# Patient Record
Sex: MALE | Race: WHITE | NOT HISPANIC OR LATINO | Employment: FULL TIME | URBAN - NONMETROPOLITAN AREA
[De-identification: names, ages, dates, MRNs, and addresses within clinical notes are randomized per-mention and may not be internally consistent; named-entity substitution may affect disease eponyms.]

---

## 2018-09-10 ENCOUNTER — OFFICE VISIT (OUTPATIENT)
Dept: UROLOGY | Facility: CLINIC | Age: 63
End: 2018-09-10

## 2018-09-10 VITALS
WEIGHT: 208 LBS | OXYGEN SATURATION: 95 % | TEMPERATURE: 97.4 F | BODY MASS INDEX: 33.43 KG/M2 | HEART RATE: 81 BPM | DIASTOLIC BLOOD PRESSURE: 70 MMHG | HEIGHT: 66 IN | SYSTOLIC BLOOD PRESSURE: 120 MMHG

## 2018-09-10 DIAGNOSIS — N20.0 NEPHROLITHIASIS: ICD-10-CM

## 2018-09-10 DIAGNOSIS — R79.89 LOW TESTOSTERONE: Primary | ICD-10-CM

## 2018-09-10 PROCEDURE — 99244 OFF/OP CNSLTJ NEW/EST MOD 40: CPT | Performed by: UROLOGY

## 2018-09-10 RX ORDER — LOSARTAN POTASSIUM 50 MG/1
TABLET ORAL
COMMUNITY
Start: 2018-08-27

## 2018-09-10 RX ORDER — TESTOSTERONE 16.2 MG/G
GEL TRANSDERMAL
COMMUNITY
Start: 2018-07-25 | End: 2018-10-05 | Stop reason: ALTCHOICE

## 2018-09-10 NOTE — PROGRESS NOTES
Chief Complaint  Low testosterone    HPI  Mr. Hollis is a 62 y.o. male  with history of TIA (2007) and stones who presents with low testosterone.  The serum test was collected due to the following complaints: low libido.    Low libido   yes  Low energy   no exercises QD w/ elliptical  Poor sleep   no, wears CPAP  Mental clarity issues  no    History of depression? no  Erectile dysfunction?  no    Any potential interest in conception?  no     No residual deficits since TIA in past.  No Hx of blood clots    He has been on Androgel and Axiron in the past, he has taken it sporadically and has had trouble with getting dose right, getting too high then too low. When levels taken in January, he was not using any gels at that time or recently.     Past Medical History  Past Medical History:   Diagnosis Date   • Arthritis    • Colon polyps    • Hypertension    • Kidney stone    • Kidney stones     Lithotripsy in past   • Stroke (cerebrum) (CMS/HCC)    • Stroke (CMS/HCC)        Past Surgical History  Past Surgical History:   Procedure Laterality Date   • APPENDECTOMY     • EXTRACORPOREAL SHOCK WAVE LITHOTRIPSY (ESWL)         Medications    Current Outpatient Prescriptions:   •  ANDROGEL PUMP 20.25 MG/ACT (1.62%) gel, , Disp: , Rfl:   •  aspirin 81 MG chewable tablet, Chew 81 mg daily., Disp: , Rfl:   •  doxazosin (CARDURA) 4 MG tablet, , Disp: , Rfl:   •  felodipine (PLENDIL) 10 MG 24 hr tablet, , Disp: , Rfl:   •  hydrochlorothiazide (MICROZIDE) 12.5 MG capsule, Take 12.5 mg by mouth daily., Disp: , Rfl:   •  losartan (COZAAR) 50 MG tablet, , Disp: , Rfl:     Allergies  No Known Allergies    Social History  Social History     Social History   • Marital status:      Spouse name: N/A   • Number of children: N/A   • Years of education: N/A     Occupational History   • Not on file.     Social History Main Topics   • Smoking status: Never Smoker   • Smokeless tobacco: Never Used   • Alcohol use 3.0  "oz/week     5 Cans of beer per week   • Drug use: No   • Sexual activity: Not on file     Other Topics Concern   • Not on file     Social History Narrative   • No narrative on file       Family History  He has no family history of prostate cancer    Review of Systems  Constitutional: No fevers or chills  Skin: Negative for rash  Endocrine: No heat/cold intolerance   Cardiovascular: Negative for chest pain or dyspnea on exertion  Respiratory: Negative for shortness of breath or wheezing  Gastrointestinal: No constipation, nausea or vomiting  Genitourinary: Negative for new lower urinary tract symptoms, current gross hematuria or dysuria.  Musculoskeletal: No flank pain  Neurological:  Negative for frequent headaches or dizziness  Lymph/Heme: Negative for leg swelling or calf pain.    Physical Exam  Visit Vitals  /70   Pulse 81   Temp 97.4 °F (36.3 °C)   Ht 167.6 cm (65.98\")   Wt 94.3 kg (208 lb)   SpO2 95%   BMI 33.59 kg/m²     Constitutional: NAD, WDWN.   HEENT: NCAT. Conjunctivae normal.  MMM.    Cardiovascular: Regular rate.  Pulmonary/Chest: Respirations are even and non-labored bilaterally.  Abdominal: Soft. No distension, tenderness, masses or guarding. No CVA tenderness.  Neurological: A + O x 3.  Cranial Nerves II-XII grossly intact. Normal gait.  Extremities: RADHA x 4, Warm. No clubbing.  No cyanosis.    Skin: Pink, warm and dry.  No rashes noted.  Psychiatric:  Normal mood and affect    Genitourinary  Penis: circumcised penis, glans normal, no penile discharge.  No rashes/lesions.    Testes: descended bilaterally, no masses, nontender to palpation. Remainder of scrotal contents normal  Perineum:  No perineal pain with palpation  Rectal:  Normal tone, no masses  Prostate:  35 grams, symmetric, non-tender, anodular and no induration    Labs  Labs from Dr. Mcdermott's office 1/19/18  Total T - 145  Free T - 3.5    No results found for: TESTOSTERONE    No results found for: PSA    No results found for: WBC, " RBC, HGB, HCT, MCV, MCH, MCHC, RDW, RDWSD, MPV, PLT, NEUTRORELPCT, LYMPHORELPCT, MONORELPCT, EOSRELPCT, BASORELPCT, AUTOIGPER, NEUTROABS, LYMPHSABS, MONOSABS, EOSABS, BASOSABS, AUTOIGNUM, NRBC      Assessment  Mr. Hollis is a 62 y.o. male with low testosterone.  Today we discussed the role testosterone plays for a male patient. I have indicated that low testosterone can be associated with metabolic syndrome, erectile dysfunction, coronary artery disease, diabetes, depression, osteoporosis, fatigue, low sex drive, poor sleep, high cholesterol, increased abdominal fat, muscle loss, irritability, hot flashes, and inability to concentrate.  I also warned him of the studies that support and refute the evidence of cardiovascular disease associated with testosterone replacement therapy.  We did also discuss the risk of blood clots associated with testosterone replacement therapy.  He wishes to proceed.     Treatment options were discussed including topical gel or patch, testosterone injections every 2-3 weeks and testosterone pellets placed in clinic every 4-6 months.    Plan  1.  He wishes to proceed with a trial of testopel - we will start with 10 pellets  2.  T labs today for baseline - tT,fT, SHBG, etsradiol, PSA, HCT, Prolactin  3.  KUB and ultrasound prior to next visit, given his history of stone disease.      Ken Be MD

## 2018-09-12 LAB
ESTRADIOL SERPL-MCNC: 31.2 PG/ML (ref 7.6–42.6)
HCT VFR BLD AUTO: 46.1 % (ref 42–52)
HGB BLD-MCNC: 15.5 G/DL (ref 14–18)
PROLACTIN SERPL-MCNC: 8.4 NG/ML (ref 4–15.2)
PSA SERPL-MCNC: 0.84 NG/ML (ref 0.06–4)
SHBG SERPL-SCNC: 32.8 NMOL/L (ref 19.3–76.4)
TESTOST FREE SERPL-MCNC: 4.7 PG/ML (ref 6.6–18.1)
TESTOST SERPL-MCNC: 241 NG/DL (ref 264–916)

## 2018-09-28 ENCOUNTER — HOSPITAL ENCOUNTER (OUTPATIENT)
Dept: ULTRASOUND IMAGING | Facility: HOSPITAL | Age: 63
Discharge: HOME | End: 2018-09-28
Attending: UROLOGY | Admitting: UROLOGY

## 2018-09-28 DIAGNOSIS — N20.0 NEPHROLITHIASIS: ICD-10-CM

## 2018-09-28 PROCEDURE — 76775 US EXAM ABDO BACK WALL LIM: CPT

## 2018-10-05 ENCOUNTER — PROCEDURE VISIT (OUTPATIENT)
Dept: UROLOGY | Facility: CLINIC | Age: 63
End: 2018-10-05

## 2018-10-05 VITALS — HEIGHT: 66 IN | BODY MASS INDEX: 33.43 KG/M2 | WEIGHT: 208 LBS

## 2018-10-05 DIAGNOSIS — R79.89 LOW TESTOSTERONE: Primary | ICD-10-CM

## 2018-10-05 PROCEDURE — 11980 IMPLANT HORMONE PELLET(S): CPT | Performed by: UROLOGY

## 2018-10-05 PROCEDURE — S0189 TESTOSTERONE PELLET 75 MG: HCPCS | Performed by: UROLOGY

## 2018-10-05 NOTE — PROGRESS NOTES
OFFICE PROCEDURE NOTE      PREPROCEDURE DIAGNOSIS:  Hypogonadism.      POSTPROCEDURE DIAGNOSIS:  Hypogonadism.      PROCEDURE:  Testopel insertion/implant in Right flank, 11 pellets  implanted.   NDC# 45564- 04-20     SURGEON:    Ken Be MD    ANESTHESIA:  Local.      DRAINS:  None.     Acne                                                    No  Breast swelling/tenderness                 No                      Leg swelling                                        No  Energy                                               so so (this is his first insertion)   Sexual Desire                                    low  Erections                                            ok     Labs reviewed,   Results for SHOAIB REAL (MRN [id number]) as of 10/5/2018 15:17   Ref. Range 9/10/2018 11:55   Prolactin Latest Ref Range: 4.0 - 15.2 ng/mL 8.4   Estradiol Latest Ref Range: 7.6 - 42.6 pg/mL 31.2   Sex Hormone Binding Globulin Latest Ref Range: 19.3 - 76.4 nmol/L 32.8   Testosterone, Total Latest Ref Range: 264 - 916 ng/dL 241 (L)   Testosterone, Free Latest Ref Range: 6.6 - 18.1 pg/mL 4.7 (L)   Hemoglobin Latest Ref Range: 14.0 - 18.0 g/dL 15.5   Hematocrit Latest Ref Range: 42.0 - 52.0 % 46.1   PSA Latest Ref Range: 0.060 - 4.000 ng/mL 0.837        The risks/benefits of the procedure were discussed with the patient.     DESCRIPTION OF PROCEDURE: The patient was placed in the lateral decubitus position and his skin was prepped with chlorhexadine solution. 10 ml of 1% lidocaine w/epinephrine were instilled subcutaneously in a straight fashion. A 3mm skin puncture was made with an 11 blade. The trochar was placed subcutaneously along the line with ease and without patient discomfort. The sharp stylet was removed and the pellets were placed along the track and positioned using the blunt stylet. Following this,  a steri strip was used to close the puncture wound. A 4/4 gauze pad was placed over the wound and a Tegaderm  bandage was applied and the patient was repositioned on his opposite side for compression purposes with a roll.      PLAN:  The patient was discharged in stable condition to be followed up with serial serum testosterone levels. Follow up for next visit in 4 months.  We will repeat labs in 2 months

## 2018-12-05 ENCOUNTER — RESULTS ENCOUNTER (OUTPATIENT)
Dept: UROLOGY | Facility: CLINIC | Age: 63
End: 2018-12-05

## 2018-12-05 DIAGNOSIS — R79.89 LOW TESTOSTERONE: ICD-10-CM

## 2019-02-01 LAB
ESTRADIOL SERPL-MCNC: 20.9 PG/ML (ref 7.6–42.6)
HCT VFR BLD AUTO: 46.7 % (ref 42–52)
HGB BLD-MCNC: 15.8 G/DL (ref 14–18)
SHBG SERPL-SCNC: 32.9 NMOL/L (ref 19.3–76.4)
TESTOST FREE SERPL-MCNC: 4.9 PG/ML (ref 6.6–18.1)
TESTOST SERPL-MCNC: 322 NG/DL (ref 264–916)

## 2019-02-05 ENCOUNTER — OFFICE VISIT (OUTPATIENT)
Dept: UROLOGY | Facility: CLINIC | Age: 64
End: 2019-02-05

## 2019-02-05 VITALS — TEMPERATURE: 97.9 F | BODY MASS INDEX: 33.43 KG/M2 | HEIGHT: 66 IN | WEIGHT: 208 LBS

## 2019-02-05 DIAGNOSIS — R79.89 LOW TESTOSTERONE: Primary | ICD-10-CM

## 2019-02-05 PROCEDURE — 99213 OFFICE O/P EST LOW 20 MIN: CPT | Performed by: UROLOGY

## 2019-02-05 NOTE — PROGRESS NOTES
Chief Complaint  Low testosterone    HPI  Mr. Hollis is a 63 y.o. male  with history of TIA (2007) and stones who presents for follow-up of low testosterone.    He says that since having the pellets placed in October, his energy level has been good, his libido has been great; he and his wife have intercourse approximately 2-3 times per week.  He is overall happy and feels like his energy and sex drive levels have been more even and constant.    At initial visit,  Low libido   yes  Low energy   no exercises QD w/ elliptical  Poor sleep   no, wears CPAP  Mental clarity issues  no  History of depression? no  Erectile dysfunction?  no  Any potential interest in conception?  no   No residual deficits since TIA in past.  No Hx of blood clots  He has been on Androgel and Axiron in the past, he has taken it sporadically and has had trouble with getting dose right, getting too high then too low. When levels taken in January, he was not using any gels at that time or recently.     Past Medical History  Past Medical History:   Diagnosis Date   • Arthritis    • Colon polyps    • Hypertension    • Kidney stone    • Kidney stones     Lithotripsy in past   • Stroke (cerebrum) (CMS/HCC)    • Stroke (CMS/HCC)        Past Surgical History  Past Surgical History:   Procedure Laterality Date   • APPENDECTOMY     • EXTRACORPOREAL SHOCK WAVE LITHOTRIPSY (ESWL)         Medications    Current Outpatient Medications:   •  aspirin 81 MG chewable tablet, Chew 81 mg daily., Disp: , Rfl:   •  doxazosin (CARDURA) 4 MG tablet, , Disp: , Rfl:   •  felodipine (PLENDIL) 10 MG 24 hr tablet, , Disp: , Rfl:   •  hydrochlorothiazide (MICROZIDE) 12.5 MG capsule, Take 12.5 mg by mouth daily., Disp: , Rfl:   •  losartan (COZAAR) 50 MG tablet, , Disp: , Rfl:     Allergies  No Known Allergies    Social History  Social History     Socioeconomic History   • Marital status:      Spouse name: Not on file   • Number of children: Not  "on file   • Years of education: Not on file   • Highest education level: Not on file   Social Needs   • Financial resource strain: Not on file   • Food insecurity - worry: Not on file   • Food insecurity - inability: Not on file   • Transportation needs - medical: Not on file   • Transportation needs - non-medical: Not on file   Occupational History   • Not on file   Tobacco Use   • Smoking status: Never Smoker   • Smokeless tobacco: Never Used   Substance and Sexual Activity   • Alcohol use: Yes     Alcohol/week: 3.0 oz     Types: 5 Cans of beer per week   • Drug use: No   • Sexual activity: Not on file   Other Topics Concern   • Not on file   Social History Narrative   • Not on file       Family History  He has no family history of prostate cancer    Review of Systems  Constitutional: No fevers or chills  Skin: Negative for rash  Endocrine: No heat/cold intolerance   Cardiovascular: Negative for chest pain or dyspnea on exertion  Respiratory: Negative for shortness of breath or wheezing  Gastrointestinal: No constipation, nausea or vomiting  Genitourinary: Negative for new lower urinary tract symptoms, current gross hematuria or dysuria.  Musculoskeletal: No flank pain  Neurological:  Negative for frequent headaches or dizziness  Lymph/Heme: Negative for leg swelling or calf pain.    Physical Exam  Visit Vitals  Temp 97.9 °F (36.6 °C)   Ht 167.6 cm (65.98\")   Wt 94.3 kg (208 lb)   BMI 33.59 kg/m²     Constitutional: NAD, WDWN.   HEENT: NCAT. Conjunctivae normal.  MMM.    Cardiovascular: Regular rate.  Pulmonary/Chest: Respirations are even and non-labored bilaterally.  Abdominal: Soft. No distension, tenderness, masses or guarding. No CVA tenderness.  Neurological: A + O x 3.  Cranial Nerves II-XII grossly intact. Normal gait.  Extremities: RADHA x 4, Warm. No clubbing.  No cyanosis.    Skin: Pink, warm and dry.  No rashes noted.  Psychiatric:  Normal mood and affect    Labs  Labs from Dr. Mcdermott's office " 1/19/18  Total T - 145  Free T - 3.5    No results found for: TESTOSTERONE    Lab Results   Component Value Date    PSA 0.837 09/10/2018       Hemoglobin   Date Value Ref Range Status   01/31/2019 15.8 14.0 - 18.0 g/dL Final     Hematocrit   Date Value Ref Range Status   01/31/2019 46.7 42.0 - 52.0 % Final         Assessment  Mr. Hollis is a 63 y.o. male with low testosterone.  He is currently happy on Testopel.  His total testosterone is within normal limits, and has drifted down a little bit I suspect over the past 4 months.  Usually we check this 2 months after pellet implantation.  Regardless, his energy and libido have improved.     Plan  1.  FU for placement of 11 pellets next week  2.  reepat labs 2 mo after pellets      Ken Be MD

## 2019-02-19 ENCOUNTER — OFFICE VISIT (OUTPATIENT)
Dept: UROLOGY | Facility: CLINIC | Age: 64
End: 2019-02-19

## 2019-02-19 VITALS — BODY MASS INDEX: 33.43 KG/M2 | RESPIRATION RATE: 16 BRPM | HEIGHT: 66 IN | WEIGHT: 208 LBS

## 2019-02-19 DIAGNOSIS — R79.89 LOW TESTOSTERONE: Primary | ICD-10-CM

## 2019-02-19 PROCEDURE — S0189 TESTOSTERONE PELLET 75 MG: HCPCS | Performed by: UROLOGY

## 2019-02-19 PROCEDURE — 11980 IMPLANT HORMONE PELLET(S): CPT | Performed by: UROLOGY

## 2019-02-19 NOTE — PROGRESS NOTES
OFFICE PROCEDURE NOTE      PREPROCEDURE DIAGNOSIS:  Hypogonadism.      POSTPROCEDURE DIAGNOSIS:  Hypogonadism.      PROCEDURE:  Testopel insertion/implant in Left flank, 11 pellets  implanted.   NDC# 09584- 04-20     SURGEON:    Ken Be MD    ANESTHESIA:  Local.      Acne                                                    No  Breast swelling/tenderness                 No                      Leg swelling                                        No  Energy                                               good  Sexual Desire                                    good  Erections                                            ok     Labs reviewed,   Results for SHOAIB REAL (MRN [id number]) as of 2/19/2019 15:50   Ref. Range 1/31/2019 09:30   Estradiol Latest Ref Range: 7.6 - 42.6 pg/mL 20.9   Sex Hormone Binding Globulin Latest Ref Range: 19.3 - 76.4 nmol/L 32.9   Testosterone, Total Latest Ref Range: 264 - 916 ng/dL 322   Testosterone, Free Latest Ref Range: 6.6 - 18.1 pg/mL 4.9 (L)   Hemoglobin Latest Ref Range: 14.0 - 18.0 g/dL 15.8   Hematocrit Latest Ref Range: 42.0 - 52.0 % 46.7        The risks/benefits of the procedure were discussed with the patient.     DESCRIPTION OF PROCEDURE: The patient was placed in the lateral decubitus position and his skin was prepped with chlorhexadine solution. 10 ml of 1% lidocaine w/epinephrine were instilled subcutaneously in a straight fashion. A 3mm skin puncture was made with an 11 blade. The trochar was placed subcutaneously along the line with ease and without patient discomfort. The sharp stylet was removed and the pellets were placed along the track and positioned using the blunt stylet. Following this,  a steri strip was used to close the puncture wound. A 4/4 gauze pad was placed over the wound and a Tegaderm bandage was applied and the patient was repositioned on his opposite side for compression purposes with a roll.      PLAN:  The patient was discharged in stable  condition to be followed up with serial serum testosterone levels. Follow up for next visit in 4 months.  We will repeat labs in 2 months

## 2019-04-06 ENCOUNTER — LAB REQUISITION (OUTPATIENT)
Dept: LAB | Facility: HOSPITAL | Age: 64
End: 2019-04-06

## 2019-04-06 DIAGNOSIS — Z79.01 LONG TERM CURRENT USE OF ANTICOAGULANT: ICD-10-CM

## 2019-04-06 PROCEDURE — 85610 PROTHROMBIN TIME: CPT | Performed by: NURSE PRACTITIONER

## 2019-04-19 ENCOUNTER — RESULTS ENCOUNTER (OUTPATIENT)
Dept: UROLOGY | Facility: CLINIC | Age: 64
End: 2019-04-19

## 2019-04-19 DIAGNOSIS — R79.89 LOW TESTOSTERONE: ICD-10-CM

## 2019-04-25 LAB
INR PPP: NORMAL (ref 0.8–1.2)
PROTHROMBIN TIME: NORMAL SECONDS (ref 11.1–15.3)

## 2019-06-24 ENCOUNTER — TRANSCRIBE ORDERS (OUTPATIENT)
Dept: UROLOGY | Facility: CLINIC | Age: 64
End: 2019-06-24

## 2019-06-24 ENCOUNTER — LAB (OUTPATIENT)
Dept: LAB | Facility: HOSPITAL | Age: 64
End: 2019-06-24

## 2019-06-24 DIAGNOSIS — R79.89 HYPOURICEMIA: Primary | ICD-10-CM

## 2019-06-24 DIAGNOSIS — R79.89 HYPOURICEMIA: ICD-10-CM

## 2019-06-24 PROCEDURE — 82670 ASSAY OF TOTAL ESTRADIOL: CPT | Performed by: UROLOGY

## 2019-06-24 PROCEDURE — 85014 HEMATOCRIT: CPT | Performed by: UROLOGY

## 2019-06-24 PROCEDURE — 84270 ASSAY OF SEX HORMONE GLOBUL: CPT | Performed by: UROLOGY

## 2019-06-24 PROCEDURE — 84402 ASSAY OF FREE TESTOSTERONE: CPT | Performed by: UROLOGY

## 2019-06-24 PROCEDURE — 84403 ASSAY OF TOTAL TESTOSTERONE: CPT | Performed by: UROLOGY

## 2019-06-24 PROCEDURE — 85018 HEMOGLOBIN: CPT | Performed by: UROLOGY

## 2019-07-18 ENCOUNTER — OFFICE VISIT (OUTPATIENT)
Dept: UROLOGY | Facility: CLINIC | Age: 64
End: 2019-07-18

## 2019-07-18 VITALS — WEIGHT: 208 LBS | HEIGHT: 66 IN | BODY MASS INDEX: 33.43 KG/M2 | RESPIRATION RATE: 16 BRPM

## 2019-07-18 DIAGNOSIS — R79.89 LOW TESTOSTERONE: Primary | ICD-10-CM

## 2019-07-18 PROCEDURE — 11980 IMPLANT HORMONE PELLET(S): CPT | Performed by: UROLOGY

## 2019-07-18 PROCEDURE — S0189 TESTOSTERONE PELLET 75 MG: HCPCS | Performed by: UROLOGY

## 2019-07-18 NOTE — PROGRESS NOTES
OFFICE PROCEDURE NOTE      PREPROCEDURE DIAGNOSIS:  Hypogonadism.      POSTPROCEDURE DIAGNOSIS:  Hypogonadism.      PROCEDURE:  Testopel insertion/implant in Right flank, 11 pellets  implanted.   NDC# 10511- 04-20     SURGEON:    Ken Be MD    ANESTHESIA:  Local.      Acne                                                    No  Breast swelling/tenderness                 No                      Leg swelling                                        No  Energy                                               good  Sexual Desire                                    good  Erections                                            ok     Labs reviewed,   Results for SHOAIB REAL (MRN [id number]) as of 7/18/2019 14:39   Ref. Range 6/24/2019 16:30   Estradiol Latest Ref Range: 7.6 - 42.6 pg/mL 33.2   Sex Hormone Binding Globulin Latest Ref Range: 19.3 - 76.4 nmol/L 31.4   Testosterone, Total Latest Ref Range: 264 - 916 ng/dL 319   Testosterone, Free Latest Ref Range: 6.6 - 18.1 pg/mL 6.0 (L)   Hemoglobin Latest Ref Range: 13.0 - 17.7 g/dL 15.8   Hematocrit Latest Ref Range: 37.5 - 51.0 % 46.8          The risks/benefits of the procedure were discussed with the patient.     DESCRIPTION OF PROCEDURE: The patient was placed in the lateral decubitus position and his skin was prepped with chlorhexadine solution. 10 ml of 1% lidocaine w/epinephrine were instilled subcutaneously in a straight fashion. A 3mm skin puncture was made with an 11 blade. The trochar was placed subcutaneously along the line with ease and without patient discomfort. The sharp stylet was removed and the pellets were placed along the track and positioned using the blunt stylet. Following this,  a steri strip was used to close the puncture wound. A 4/4 gauze pad was placed over the wound and a Tegaderm bandage was applied and the patient was repositioned on his opposite side for compression purposes with a roll.      PLAN:  The patient was discharged in  stable condition to be followed up with serial serum testosterone levels. Follow up for next visit in 4 months.  We will repeat labs in 2 months

## 2019-09-11 DIAGNOSIS — R79.89 LOW TESTOSTERONE: ICD-10-CM

## 2019-09-13 LAB
ESTRADIOL SERPL-MCNC: 41.2 PG/ML (ref 7.6–42.6)
HCT VFR BLD AUTO: 47 % (ref 37.5–51)
HGB BLD-MCNC: 15.2 G/DL (ref 13–17.7)
SHBG SERPL-SCNC: 29.9 NMOL/L (ref 19.3–76.4)
TESTOST FREE SERPL-MCNC: 9.1 PG/ML (ref 6.6–18.1)
TESTOST SERPL-MCNC: 523 NG/DL (ref 264–916)

## 2019-11-21 ENCOUNTER — OFFICE VISIT (OUTPATIENT)
Dept: UROLOGY | Facility: CLINIC | Age: 64
End: 2019-11-21

## 2019-11-21 VITALS — HEIGHT: 66 IN | WEIGHT: 208 LBS | RESPIRATION RATE: 19 BRPM | BODY MASS INDEX: 33.43 KG/M2

## 2019-11-21 DIAGNOSIS — R79.89 LOW TESTOSTERONE: Primary | ICD-10-CM

## 2019-11-21 PROCEDURE — 11980 IMPLANT HORMONE PELLET(S): CPT | Performed by: UROLOGY

## 2019-11-21 PROCEDURE — S0189 TESTOSTERONE PELLET 75 MG: HCPCS | Performed by: UROLOGY

## 2019-11-21 NOTE — PROGRESS NOTES
OFFICE PROCEDURE NOTE      PREPROCEDURE DIAGNOSIS:  Hypogonadism.      POSTPROCEDURE DIAGNOSIS:  Hypogonadism.      PROCEDURE:  Testopel insertion/implant in Right flank, 11 pellets  implanted.   NDC# 07836- 04-20     SURGEON:    Ken Be MD    ANESTHESIA:  Local.      Acne                                                    No  Breast swelling/tenderness                 No                      Leg swelling                                        No  Energy                                               good  Sexual Desire                                    good  Erections                                            ok     Labs reviewed,   Results for SHOAIB REAL (MRN [id number]) as of 11/21/2019 14:12   Ref. Range 9/11/2019 14:27   Estradiol Latest Ref Range: 7.6 - 42.6 pg/mL 41.2   Sex Hormone Binding Globulin Latest Ref Range: 19.3 - 76.4 nmol/L 29.9   Testosterone, Total Latest Ref Range: 264 - 916 ng/dL 523   Testosterone, Free Latest Ref Range: 6.6 - 18.1 pg/mL 9.1   Hemoglobin Latest Ref Range: 13.0 - 17.7 g/dL 15.2   Hematocrit Latest Ref Range: 37.5 - 51.0 % 47.0          The risks/benefits of the procedure were discussed with the patient.     DESCRIPTION OF PROCEDURE: The patient was placed in the lateral decubitus position and his skin was prepped with chlorhexadine solution. 10 ml of 1% lidocaine w/epinephrine were instilled subcutaneously in a straight fashion. A 3mm skin puncture was made with an 11 blade. The trochar was placed subcutaneously along the line with ease and without patient discomfort. The sharp stylet was removed and the pellets were placed along the track and positioned using the blunt stylet. Following this,  a steri strip was used to close the puncture wound. A 4/4 gauze pad was placed over the wound and a Tegaderm bandage was applied and the patient was repositioned on his opposite side for compression purposes with a roll.      PLAN:  The patient was discharged in stable  condition to be followed up with serial serum testosterone levels. Follow up for next visit in 4 months.  We will repeat labs in 2 months

## 2020-01-21 ENCOUNTER — RESULTS ENCOUNTER (OUTPATIENT)
Dept: UROLOGY | Facility: CLINIC | Age: 65
End: 2020-01-21

## 2020-01-21 DIAGNOSIS — R79.89 LOW TESTOSTERONE: ICD-10-CM

## 2020-01-29 ENCOUNTER — TRANSCRIBE ORDERS (OUTPATIENT)
Dept: ADMINISTRATIVE | Facility: HOSPITAL | Age: 65
End: 2020-01-29

## 2020-01-29 DIAGNOSIS — N28.9 RENAL INSUFFICIENCY: Primary | ICD-10-CM

## 2020-01-30 LAB
HCT VFR BLD AUTO: 46.1 % (ref 37.5–51)
HGB BLD-MCNC: 15.6 G/DL (ref 13–17.7)
SHBG SERPL-SCNC: 27.8 NMOL/L (ref 19.3–76.4)
TESTOST FREE SERPL-MCNC: 13.8 PG/ML (ref 6.6–18.1)
TESTOST SERPL-MCNC: 550 NG/DL (ref 264–916)

## 2020-02-06 ENCOUNTER — HOSPITAL ENCOUNTER (OUTPATIENT)
Dept: ULTRASOUND IMAGING | Facility: HOSPITAL | Age: 65
Discharge: HOME | End: 2020-02-06
Admitting: FAMILY MEDICINE

## 2020-02-06 DIAGNOSIS — N28.9 RENAL INSUFFICIENCY: ICD-10-CM

## 2020-02-06 PROCEDURE — 76775 US EXAM ABDO BACK WALL LIM: CPT

## 2020-06-23 ENCOUNTER — LAB (OUTPATIENT)
Dept: UROLOGY | Facility: CLINIC | Age: 65
End: 2020-06-23

## 2020-06-23 DIAGNOSIS — R79.89 LOW TESTOSTERONE: Primary | ICD-10-CM

## 2020-06-24 LAB
BASOPHILS # BLD AUTO: 0.04 10*3/MM3 (ref 0–0.2)
BASOPHILS NFR BLD AUTO: 0.6 % (ref 0–1.5)
EOSINOPHIL # BLD AUTO: 0.13 10*3/MM3 (ref 0–0.4)
EOSINOPHIL NFR BLD AUTO: 2 % (ref 0.3–6.2)
ERYTHROCYTE [DISTWIDTH] IN BLOOD BY AUTOMATED COUNT: 13.6 % (ref 12.3–15.4)
ESTRADIOL SERPL-MCNC: 20.9 PG/ML (ref 7.6–42.6)
HCT VFR BLD AUTO: 43.8 % (ref 37.5–51)
HGB BLD-MCNC: 15.1 G/DL (ref 13–17.7)
IMM GRANULOCYTES # BLD AUTO: 0.02 10*3/MM3 (ref 0–0.05)
IMM GRANULOCYTES NFR BLD AUTO: 0.3 % (ref 0–0.5)
LYMPHOCYTES # BLD AUTO: 1.36 10*3/MM3 (ref 0.7–3.1)
LYMPHOCYTES NFR BLD AUTO: 21.1 % (ref 19.6–45.3)
MCH RBC QN AUTO: 30.1 PG (ref 26.6–33)
MCHC RBC AUTO-ENTMCNC: 34.5 G/DL (ref 31.5–35.7)
MCV RBC AUTO: 87.4 FL (ref 79–97)
MONOCYTES # BLD AUTO: 0.57 10*3/MM3 (ref 0.1–0.9)
MONOCYTES NFR BLD AUTO: 8.8 % (ref 5–12)
NEUTROPHILS # BLD AUTO: 4.33 10*3/MM3 (ref 1.7–7)
NEUTROPHILS NFR BLD AUTO: 67.2 % (ref 42.7–76)
NRBC BLD AUTO-RTO: 0 /100 WBC (ref 0–0.2)
PLATELET # BLD AUTO: 250 10*3/MM3 (ref 140–450)
RBC # BLD AUTO: 5.01 10*6/MM3 (ref 4.14–5.8)
TESTOST SERPL-MCNC: 163 NG/DL (ref 264–916)
WBC # BLD AUTO: 6.45 10*3/MM3 (ref 3.4–10.8)

## 2020-12-28 ENCOUNTER — OFFICE VISIT (OUTPATIENT)
Dept: UROLOGY | Facility: CLINIC | Age: 65
End: 2020-12-28

## 2020-12-28 VITALS
HEIGHT: 66 IN | BODY MASS INDEX: 33.43 KG/M2 | TEMPERATURE: 98.3 F | OXYGEN SATURATION: 93 % | WEIGHT: 208 LBS | HEART RATE: 84 BPM

## 2020-12-28 DIAGNOSIS — R79.89 LOW TESTOSTERONE: Primary | ICD-10-CM

## 2020-12-28 PROCEDURE — 99214 OFFICE O/P EST MOD 30 MIN: CPT | Performed by: UROLOGY

## 2020-12-28 RX ORDER — SILDENAFIL 100 MG/1
TABLET, FILM COATED ORAL
COMMUNITY
Start: 2020-11-13

## 2020-12-28 RX ORDER — TESTOSTERONE 16.2 MG/G
GEL TRANSDERMAL
COMMUNITY
Start: 2020-12-03 | End: 2020-12-28

## 2020-12-28 NOTE — PROGRESS NOTES
Chief Complaint  Low testosterone    HPI  Mr. Hollis is a 65 y.o. male  with history of TIA (2007) and stones who presents for follow-up of low testosterone.    He says that he was very happy with pellets.  He was scared to follow-up during the pandemic and use some old testosterone gel that he had.  He wants to get back on the pellets.    At initial visit,  Low libido   yes  Low energy   no exercises QD w/ elliptical  Poor sleep   no, wears CPAP  Mental clarity issues  no  History of depression? no  Erectile dysfunction?  no  Any potential interest in conception?  no   No residual deficits since TIA in past.  No Hx of blood clots  He has been on Androgel and Axiron in the past, he has taken it sporadically and has had trouble with getting dose right, getting too high then too low. When levels taken in January, he was not using any gels at that time or recently.     Past Medical History  Past Medical History:   Diagnosis Date   • Arthritis    • Colon polyps    • Hypertension    • Kidney stone    • Kidney stones     Lithotripsy in past   • Stroke (cerebrum) (CMS/HCC)    • Stroke (CMS/HCC)        Past Surgical History  Past Surgical History:   Procedure Laterality Date   • APPENDECTOMY     • EXTRACORPOREAL SHOCK WAVE LITHOTRIPSY (ESWL)         Medications    Current Outpatient Medications:   •  aspirin 81 MG chewable tablet, Chew 81 mg daily., Disp: , Rfl:   •  doxazosin (CARDURA) 4 MG tablet, , Disp: , Rfl:   •  felodipine (PLENDIL) 10 MG 24 hr tablet, , Disp: , Rfl:   •  hydrochlorothiazide (MICROZIDE) 12.5 MG capsule, Take 12.5 mg by mouth daily., Disp: , Rfl:   •  losartan (COZAAR) 50 MG tablet, , Disp: , Rfl:   •  sildenafil (VIAGRA) 100 MG tablet, TAKE 1/2 1 TABLET BY MOUTH EVERY DAY AS NEEDED AS DIRECTED, Disp: , Rfl:   •  Testosterone 20.25 MG/ACT (1.62%) gel, USE 1 PUMP UNDER EACH ARM DAILY, Disp: , Rfl:     Current Facility-Administered Medications:   •  Testosterone (TESTOPEL) 75 MG  "implant pellet 11 each, 11 each, Implant, Once, Ken Be MD    Allergies  No Known Allergies    Social History  Social History     Socioeconomic History   • Marital status:      Spouse name: Not on file   • Number of children: Not on file   • Years of education: Not on file   • Highest education level: Not on file   Tobacco Use   • Smoking status: Never Smoker   • Smokeless tobacco: Never Used   Substance and Sexual Activity   • Alcohol use: Yes     Alcohol/week: 5.0 standard drinks     Types: 5 Cans of beer per week   • Drug use: No       Family History  He has no family history of prostate cancer    Review of Systems  Constitutional: No fevers or chills  Skin: Negative for rash  Endocrine: No heat/cold intolerance   Cardiovascular: Negative for chest pain or dyspnea on exertion  Respiratory: Negative for shortness of breath or wheezing  Gastrointestinal: No constipation, nausea or vomiting  Genitourinary: Negative for new lower urinary tract symptoms, current gross hematuria or dysuria.  Musculoskeletal: No flank pain  Neurological:  Negative for frequent headaches or dizziness  Lymph/Heme: Negative for leg swelling or calf pain.    Physical Exam  Visit Vitals  Pulse 84   Temp 98.3 °F (36.8 °C)   Ht 167.6 cm (66\")   Wt 94.3 kg (208 lb)   SpO2 93%   BMI 33.57 kg/m²     Constitutional: NAD, WDWN.   HEENT: NCAT. Conjunctivae normal.  MMM.    Cardiovascular: Regular rate.  Pulmonary/Chest: Respirations are even and non-labored bilaterally.  Abdominal: Soft. No distension, tenderness, masses or guarding. No CVA tenderness.  Neurological: A + O x 3.  Cranial Nerves II-XII grossly intact. Normal gait.  Extremities: RADHA x 4, Warm. No clubbing.  No cyanosis.    Skin: Pink, warm and dry.  No rashes noted.  Psychiatric:  Normal mood and affect    Labs  Labs from Dr. Mcdermott's office 1/19/18  Total T - 145  Free T - 3.5    No results found for: TESTOSTERONE    Lab Results   Component Value Date    PSA " 0.837 09/10/2018       WBC   Date Value Ref Range Status   06/23/2020 6.45 3.40 - 10.80 10*3/mm3 Final     RBC   Date Value Ref Range Status   06/23/2020 5.01 4.14 - 5.80 10*6/mm3 Final     Hemoglobin   Date Value Ref Range Status   06/23/2020 15.1 13.0 - 17.7 g/dL Final   06/24/2019 15.8 13.0 - 17.7 g/dL Final     Hematocrit   Date Value Ref Range Status   06/23/2020 43.8 37.5 - 51.0 % Final   06/24/2019 46.8 37.5 - 51.0 % Final     MCV   Date Value Ref Range Status   06/23/2020 87.4 79.0 - 97.0 fL Final     MCH   Date Value Ref Range Status   06/23/2020 30.1 26.6 - 33.0 pg Final     MCHC   Date Value Ref Range Status   06/23/2020 34.5 31.5 - 35.7 g/dL Final     RDW   Date Value Ref Range Status   06/23/2020 13.6 12.3 - 15.4 % Final     Platelets   Date Value Ref Range Status   06/23/2020 250 140 - 450 10*3/mm3 Final     Neutrophil Rel %   Date Value Ref Range Status   06/23/2020 67.2 42.7 - 76.0 % Final     Lymphocyte Rel %   Date Value Ref Range Status   06/23/2020 21.1 19.6 - 45.3 % Final     Monocyte Rel %   Date Value Ref Range Status   06/23/2020 8.8 5.0 - 12.0 % Final     Eosinophil Rel %   Date Value Ref Range Status   06/23/2020 2.0 0.3 - 6.2 % Final     Basophil Rel %   Date Value Ref Range Status   06/23/2020 0.6 0.0 - 1.5 % Final     Neutrophils Absolute   Date Value Ref Range Status   06/23/2020 4.33 1.70 - 7.00 10*3/mm3 Final     Lymphocytes Absolute   Date Value Ref Range Status   06/23/2020 1.36 0.70 - 3.10 10*3/mm3 Final     Monocytes Absolute   Date Value Ref Range Status   06/23/2020 0.57 0.10 - 0.90 10*3/mm3 Final     Eosinophils Absolute   Date Value Ref Range Status   06/23/2020 0.13 0.00 - 0.40 10*3/mm3 Final     Basophils Absolute   Date Value Ref Range Status   06/23/2020 0.04 0.00 - 0.20 10*3/mm3 Final     nRBC   Date Value Ref Range Status   06/23/2020 0.0 0.0 - 0.2 /100 WBC Final         Assessment  Mr. Hollis is a 65 y.o. male with low testosterone.  He was happy on Testopel.  His  total testosterone was recently checked and low.  He has not followed up during the pandemic.  Usually we check this 2 months after pellet implantation.  Regardless, his energy and libido have improved.     Plan  1.  FU for placement of 11 pellets next week  2.  repeat labs 2 mo after pellets      Ken Be MD

## 2020-12-28 NOTE — PROGRESS NOTES
Chief Complaint  Low testosterone    HPI  Mr. Hollis is a 65 y.o. male  with history of TIA (2007) and stones who presents for follow-up of low testosterone.    He says that since having the pellets placed in October, his energy level has been good, his libido has been great; he and his wife have intercourse approximately 2-3 times per week.  He is overall happy and feels like his energy and sex drive levels have been more even and constant.    At initial visit,  Low libido   yes  Low energy   no exercises QD w/ elliptical  Poor sleep   no, wears CPAP  Mental clarity issues  no  History of depression? no  Erectile dysfunction?  no  Any potential interest in conception?  no   No residual deficits since TIA in past.  No Hx of blood clots  He has been on Androgel and Axiron in the past, he has taken it sporadically and has had trouble with getting dose right, getting too high then too low. When levels taken in January, he was not using any gels at that time or recently.     Past Medical History  Past Medical History:   Diagnosis Date   • Arthritis    • Colon polyps    • Hypertension    • Kidney stone    • Kidney stones     Lithotripsy in past   • Stroke (cerebrum) (CMS/HCC)    • Stroke (CMS/HCC)        Past Surgical History  Past Surgical History:   Procedure Laterality Date   • APPENDECTOMY     • EXTRACORPOREAL SHOCK WAVE LITHOTRIPSY (ESWL)         Medications    Current Outpatient Medications:   •  aspirin 81 MG chewable tablet, Chew 81 mg daily., Disp: , Rfl:   •  doxazosin (CARDURA) 4 MG tablet, , Disp: , Rfl:   •  felodipine (PLENDIL) 10 MG 24 hr tablet, , Disp: , Rfl:   •  hydrochlorothiazide (MICROZIDE) 12.5 MG capsule, Take 12.5 mg by mouth daily., Disp: , Rfl:   •  losartan (COZAAR) 50 MG tablet, , Disp: , Rfl:   •  sildenafil (VIAGRA) 100 MG tablet, TAKE 1/2 1 TABLET BY MOUTH EVERY DAY AS NEEDED AS DIRECTED, Disp: , Rfl:   •  Testosterone 20.25 MG/ACT (1.62%) gel, USE 1 PUMP UNDER EACH  "ARM DAILY, Disp: , Rfl:     Current Facility-Administered Medications:   •  Testosterone (TESTOPEL) 75 MG implant pellet 11 each, 11 each, Implant, Once, Ken Be MD    Allergies  No Known Allergies    Social History  Social History     Socioeconomic History   • Marital status:      Spouse name: Not on file   • Number of children: Not on file   • Years of education: Not on file   • Highest education level: Not on file   Tobacco Use   • Smoking status: Never Smoker   • Smokeless tobacco: Never Used   Substance and Sexual Activity   • Alcohol use: Yes     Alcohol/week: 5.0 standard drinks     Types: 5 Cans of beer per week   • Drug use: No       Family History  He has no family history of prostate cancer    Review of Systems  Constitutional: No fevers or chills  Skin: Negative for rash  Endocrine: No heat/cold intolerance   Cardiovascular: Negative for chest pain or dyspnea on exertion  Respiratory: Negative for shortness of breath or wheezing  Gastrointestinal: No constipation, nausea or vomiting  Genitourinary: Negative for new lower urinary tract symptoms, current gross hematuria or dysuria.  Musculoskeletal: No flank pain  Neurological:  Negative for frequent headaches or dizziness  Lymph/Heme: Negative for leg swelling or calf pain.    Physical Exam  Visit Vitals  Pulse 84   Temp 98.3 °F (36.8 °C)   Ht 167.6 cm (66\")   Wt 94.3 kg (208 lb)   SpO2 93%   BMI 33.57 kg/m²     Constitutional: NAD, WDWN.   HEENT: NCAT. Conjunctivae normal.  MMM.    Cardiovascular: Regular rate.  Pulmonary/Chest: Respirations are even and non-labored bilaterally.  Abdominal: Soft. No distension, tenderness, masses or guarding. No CVA tenderness.  Neurological: A + O x 3.  Cranial Nerves II-XII grossly intact. Normal gait.  Extremities: RADHA x 4, Warm. No clubbing.  No cyanosis.    Skin: Pink, warm and dry.  No rashes noted.  Psychiatric:  Normal mood and affect    Labs  Labs from Dr. Mcdermott's office 1/19/18  Total T - " 145  Free T - 3.5    No results found for: TESTOSTERONE    Lab Results   Component Value Date    PSA 0.837 09/10/2018     Results for SHOAIB REAL (MRN [id number]) as of 12/28/2020 14:26   Ref. Range 6/23/2020 13:12   Estradiol Latest Ref Range: 7.6 - 42.6 pg/mL 20.9   Testosterone, Total Latest Ref Range: 264 - 916 ng/dL 163 (L)       Assessment  Mr. Real is a 65 y.o. male with low testosterone.  He is currently happy on Testopel.  His total testosterone is within normal limits, and has drifted down a little bit I suspect over the past 4 months.  Usually we check this 2 months after pellet implantation.  Regardless, his energy and libido have improved.     Plan  1.  FU for placement of 11 pellets next week  2.  repeat labs 2 mo after pellets      Ken Be MD

## 2021-01-04 ENCOUNTER — TRANSCRIBE ORDERS (OUTPATIENT)
Dept: ADMINISTRATIVE | Facility: HOSPITAL | Age: 66
End: 2021-01-04

## 2021-01-04 DIAGNOSIS — M54.16 LUMBAR RADICULOPATHY: Primary | ICD-10-CM

## 2021-01-06 ENCOUNTER — HOSPITAL ENCOUNTER (OUTPATIENT)
Dept: MRI IMAGING | Facility: HOSPITAL | Age: 66
End: 2021-01-06

## 2021-01-15 ENCOUNTER — HOSPITAL ENCOUNTER (OUTPATIENT)
Dept: MRI IMAGING | Facility: HOSPITAL | Age: 66
Discharge: HOME | End: 2021-01-15
Admitting: FAMILY MEDICINE

## 2021-01-15 DIAGNOSIS — M54.16 LUMBAR RADICULOPATHY: ICD-10-CM

## 2021-01-15 PROCEDURE — 72148 MRI LUMBAR SPINE W/O DYE: CPT

## 2021-01-21 ENCOUNTER — PROCEDURE VISIT (OUTPATIENT)
Dept: UROLOGY | Facility: CLINIC | Age: 66
End: 2021-01-21

## 2021-01-21 VITALS
TEMPERATURE: 97.5 F | HEART RATE: 80 BPM | BODY MASS INDEX: 33.75 KG/M2 | WEIGHT: 210 LBS | OXYGEN SATURATION: 97 % | HEIGHT: 66 IN

## 2021-01-21 DIAGNOSIS — R79.89 LOW TESTOSTERONE: Primary | ICD-10-CM

## 2021-01-21 PROCEDURE — S0189 TESTOSTERONE PELLET 75 MG: HCPCS | Performed by: UROLOGY

## 2021-01-21 PROCEDURE — 11980 IMPLANT HORMONE PELLET(S): CPT | Performed by: UROLOGY

## 2021-01-21 NOTE — PROGRESS NOTES
OFFICE PROCEDURE NOTE      PREPROCEDURE DIAGNOSIS:  Hypogonadism.      POSTPROCEDURE DIAGNOSIS:  Hypogonadism.      PROCEDURE:  Testopel insertion/implant in Right flank, 11 pellets  implanted.   NDC# 13754- 04-20     SURGEON:    Ken Be MD    ANESTHESIA:  Local.      Acne                                                    No  Breast swelling/tenderness                 No                      Leg swelling                                        No  Energy                                               good  Sexual Desire                                    good  Erections                                            ok     Labs reviewed,   Results for SHOAIB REAL (MRN [id number]) as of 1/21/2021 12:10   Ref. Range 6/23/2020 13:12   Estradiol Latest Ref Range: 7.6 - 42.6 pg/mL 20.9   Testosterone, Total Latest Ref Range: 264 - 916 ng/dL 163 (L)   WBC Latest Ref Range: 3.40 - 10.80 10*3/mm3 6.45   RBC Latest Ref Range: 4.14 - 5.80 10*6/mm3 5.01   Hemoglobin Latest Ref Range: 13.0 - 17.7 g/dL 15.1   Hematocrit Latest Ref Range: 37.5 - 51.0 % 43.8          The risks/benefits of the procedure were discussed with the patient.     DESCRIPTION OF PROCEDURE: The patient was placed in the lateral decubitus position and his skin was prepped with chlorhexadine solution. 10 ml of 1% lidocaine w/epinephrine were instilled subcutaneously in a straight fashion. A 3mm skin puncture was made with an 11 blade. The trochar was placed subcutaneously along the line with ease and without patient discomfort. The sharp stylet was removed and the pellets were placed along the track and positioned using the blunt stylet. Following this,  a steri strip was used to close the puncture wound. A 4/4 gauze pad was placed over the wound and a Tegaderm bandage was applied and the patient was repositioned on his opposite side for compression purposes with a roll.      PLAN:  The patient was discharged in stable condition to be followed up  with serial serum testosterone levels. Follow up for next visit in 4 months.  We will repeat labs in 2 months

## 2021-03-22 ENCOUNTER — LAB (OUTPATIENT)
Dept: UROLOGY | Facility: CLINIC | Age: 66
End: 2021-03-22

## 2021-06-24 ENCOUNTER — PROCEDURE VISIT (OUTPATIENT)
Dept: UROLOGY | Facility: CLINIC | Age: 66
End: 2021-06-24

## 2021-06-24 DIAGNOSIS — R79.89 LOW TESTOSTERONE: Primary | ICD-10-CM

## 2021-06-24 PROCEDURE — S0189 TESTOSTERONE PELLET 75 MG: HCPCS | Performed by: UROLOGY

## 2021-06-24 PROCEDURE — 11980 IMPLANT HORMONE PELLET(S): CPT | Performed by: UROLOGY

## 2021-06-24 NOTE — PROGRESS NOTES
OFFICE PROCEDURE NOTE      PREPROCEDURE DIAGNOSIS:  Hypogonadism.      POSTPROCEDURE DIAGNOSIS:  Hypogonadism.      PROCEDURE:  Testopel (NDC# 47060-104-89) insertion/implant in Right flank, 10 pellets  implanted.      SURGEON:    Ken Be MD    ANESTHESIA:  Local.      DRAINS:  None.        The risks/benefits of the procedure were discussed with the patient.     DESCRIPTION OF PROCEDURE: The patient was placed in the lateral decubitus position and his skin was prepped with chlorhexadine solution. 10 ml of 1% lidocaine w/epinephrine were instilled subcutaneously in a straight fashion. A 3mm skin puncture was made with an 11 blade. The trochar was placed subcutaneously along the line with ease and without patient discomfort. The sharp stylet was removed and the pellets were placed along the track and positioned using the blunt stylet. Following this,  a steri strip was used to close the puncture wound. A 4/4 gauze pad was placed over the wound and a Tegaderm bandage was applied and the patient was repositioned on his opposite side for compression purposes with a roll.      PLAN:  The patient was discharged in stable condition to be followed up with serial serum testosterone levels. Follow up for next visit in four months for Testopel on the other side.  Labs in 2 months

## 2021-08-16 ENCOUNTER — LAB (OUTPATIENT)
Dept: UROLOGY | Facility: CLINIC | Age: 66
End: 2021-08-16

## 2021-08-17 LAB
HCT VFR BLD AUTO: 45.4 % (ref 37.5–51)
HGB BLD-MCNC: 15.4 G/DL (ref 13–17.7)
TESTOST SERPL-MCNC: 709 NG/DL (ref 264–916)

## 2021-10-28 ENCOUNTER — PROCEDURE VISIT (OUTPATIENT)
Dept: UROLOGY | Facility: CLINIC | Age: 66
End: 2021-10-28

## 2021-10-28 VITALS
OXYGEN SATURATION: 96 % | HEART RATE: 81 BPM | WEIGHT: 210 LBS | TEMPERATURE: 98 F | HEIGHT: 66 IN | DIASTOLIC BLOOD PRESSURE: 74 MMHG | SYSTOLIC BLOOD PRESSURE: 126 MMHG | BODY MASS INDEX: 33.75 KG/M2 | RESPIRATION RATE: 16 BRPM

## 2021-10-28 DIAGNOSIS — E29.1 HYPOGONADISM IN MALE: Primary | ICD-10-CM

## 2021-10-28 PROCEDURE — S0189 TESTOSTERONE PELLET 75 MG: HCPCS | Performed by: UROLOGY

## 2021-10-28 PROCEDURE — 11980 IMPLANT HORMONE PELLET(S): CPT | Performed by: UROLOGY

## 2021-10-28 NOTE — PROGRESS NOTES
OFFICE PROCEDURE NOTE      PREPROCEDURE DIAGNOSIS:  Hypogonadism.      POSTPROCEDURE DIAGNOSIS:  Hypogonadism.      PROCEDURE:  Testopel insertion/implant in Left flank, 11 pellets  implanted.   NDC# 41047- 04-20     SURGEON:    Ken Be MD    ANESTHESIA:  Local.       Labs reviewed,   Results for SHOAIB REAL (MRN [id number]) as of 10/28/2021 09:38   Ref. Range 8/16/2021 13:51   Testosterone, Total Latest Ref Range: 264 - 916 ng/dL 709   Hemoglobin Latest Ref Range: 13.0 - 17.7 g/dL 15.4   Hematocrit Latest Ref Range: 37.5 - 51.0 % 45.4        The risks/benefits of the procedure were discussed with the patient.     DESCRIPTION OF PROCEDURE: The patient was placed in the lateral decubitus position and his skin was prepped with chlorhexadine solution. 10 ml of 1% lidocaine w/epinephrine were instilled subcutaneously in a straight fashion. A 3mm skin puncture was made with an 11 blade. The trochar was placed subcutaneously along the line with ease and without patient discomfort. The sharp stylet was removed and the pellets were placed along the track and positioned using the blunt stylet. Following this,  a steri strip was used to close the puncture wound. A 4/4 gauze pad was placed over the wound and a Tegaderm bandage was applied and the patient was repositioned on his opposite side for compression purposes with a roll.      PLAN:  The patient was discharged in stable condition to be followed up with serial serum testosterone levels. Follow up for next visit in 4 months.  We will repeat labs in 4 months

## 2021-12-01 ENCOUNTER — TRANSCRIBE ORDERS (OUTPATIENT)
Dept: LAB | Facility: HOSPITAL | Age: 66
End: 2021-12-01

## 2021-12-01 ENCOUNTER — LAB (OUTPATIENT)
Dept: LAB | Facility: HOSPITAL | Age: 66
End: 2021-12-01

## 2021-12-01 DIAGNOSIS — N18.2 CHRONIC RENAL DISEASE, STAGE 2, MILDLY DECREASED GLOMERULAR FILTRATION RATE (GFR) BETWEEN 60-89 ML/MIN/1.73 SQUARE METER: Primary | ICD-10-CM

## 2021-12-01 DIAGNOSIS — N18.2 CHRONIC RENAL DISEASE, STAGE 2, MILDLY DECREASED GLOMERULAR FILTRATION RATE (GFR) BETWEEN 60-89 ML/MIN/1.73 SQUARE METER: ICD-10-CM

## 2021-12-01 LAB — URATE SERPL-MCNC: 8.9 MG/DL (ref 3.4–7)

## 2021-12-01 PROCEDURE — 85014 HEMATOCRIT: CPT | Performed by: UROLOGY

## 2021-12-01 PROCEDURE — 80069 RENAL FUNCTION PANEL: CPT

## 2021-12-01 PROCEDURE — 36415 COLL VENOUS BLD VENIPUNCTURE: CPT

## 2021-12-01 PROCEDURE — 84550 ASSAY OF BLOOD/URIC ACID: CPT

## 2021-12-01 PROCEDURE — 84403 ASSAY OF TOTAL TESTOSTERONE: CPT | Performed by: UROLOGY

## 2021-12-01 PROCEDURE — 85018 HEMOGLOBIN: CPT | Performed by: UROLOGY

## 2021-12-02 LAB
ALBUMIN SERPL-MCNC: 4 G/DL (ref 3.5–5.2)
ANION GAP SERPL CALCULATED.3IONS-SCNC: 12.5 MMOL/L (ref 5–15)
BUN SERPL-MCNC: 16 MG/DL (ref 8–23)
BUN/CREAT SERPL: 13 (ref 7–25)
CALCIUM SPEC-SCNC: 9 MG/DL (ref 8.6–10.5)
CHLORIDE SERPL-SCNC: 99 MMOL/L (ref 98–107)
CO2 SERPL-SCNC: 25.5 MMOL/L (ref 22–29)
CREAT SERPL-MCNC: 1.23 MG/DL (ref 0.76–1.27)
GFR SERPL CREATININE-BSD FRML MDRD: 59 ML/MIN/1.73
GLUCOSE SERPL-MCNC: 127 MG/DL (ref 65–99)
PHOSPHATE SERPL-MCNC: 3 MG/DL (ref 2.5–4.5)
POTASSIUM SERPL-SCNC: 3.4 MMOL/L (ref 3.5–5.2)
SODIUM SERPL-SCNC: 137 MMOL/L (ref 136–145)

## 2021-12-23 ENCOUNTER — TRANSCRIBE ORDERS (OUTPATIENT)
Dept: LAB | Facility: HOSPITAL | Age: 66
End: 2021-12-23

## 2021-12-23 ENCOUNTER — LAB (OUTPATIENT)
Dept: LAB | Facility: HOSPITAL | Age: 66
End: 2021-12-23

## 2021-12-23 DIAGNOSIS — E29.1 3-OXO-5 ALPHA-STEROID DELTA 4-DEHYDROGENASE DEFICIENCY: ICD-10-CM

## 2021-12-23 DIAGNOSIS — R73.03 PRE-DIABETES: ICD-10-CM

## 2021-12-23 DIAGNOSIS — E55.9 VITAMIN D DEFICIENCY: ICD-10-CM

## 2021-12-23 DIAGNOSIS — R53.83 FATIGUE, UNSPECIFIED TYPE: ICD-10-CM

## 2021-12-23 DIAGNOSIS — N28.9 RENAL INSUFFICIENCY: ICD-10-CM

## 2021-12-23 DIAGNOSIS — E11.22 CONTROLLED TYPE 2 DIABETES MELLITUS WITH CHRONIC KIDNEY DISEASE, UNSPECIFIED CKD STAGE, UNSPECIFIED WHETHER LONG TERM INSULIN USE (HCC): Primary | ICD-10-CM

## 2021-12-23 DIAGNOSIS — Z00.00 ROUTINE GENERAL MEDICAL EXAMINATION AT A HEALTH CARE FACILITY: ICD-10-CM

## 2021-12-23 DIAGNOSIS — E78.00 PURE HYPERCHOLESTEROLEMIA: ICD-10-CM

## 2021-12-23 DIAGNOSIS — Z12.5 SCREENING PSA (PROSTATE SPECIFIC ANTIGEN): ICD-10-CM

## 2021-12-23 DIAGNOSIS — I10 PRIMARY HYPERTENSION: ICD-10-CM

## 2021-12-23 DIAGNOSIS — D51.9 ANEMIA DUE TO VITAMIN B12 DEFICIENCY, UNSPECIFIED B12 DEFICIENCY TYPE: ICD-10-CM

## 2021-12-23 DIAGNOSIS — R53.83 FATIGUE, UNSPECIFIED TYPE: Primary | ICD-10-CM

## 2021-12-23 DIAGNOSIS — E87.6 HYPOKALEMIA: ICD-10-CM

## 2021-12-23 DIAGNOSIS — E11.22 CONTROLLED TYPE 2 DIABETES MELLITUS WITH CHRONIC KIDNEY DISEASE, UNSPECIFIED CKD STAGE, UNSPECIFIED WHETHER LONG TERM INSULIN USE (HCC): ICD-10-CM

## 2021-12-23 LAB
25(OH)D3 SERPL-MCNC: 62.4 NG/ML (ref 30–100)
ALBUMIN SERPL-MCNC: 3.9 G/DL (ref 3.5–5.2)
ALBUMIN/GLOB SERPL: 1.4 G/DL
ALP SERPL-CCNC: 68 U/L (ref 39–117)
ALT SERPL W P-5'-P-CCNC: 17 U/L (ref 1–41)
ANION GAP SERPL CALCULATED.3IONS-SCNC: 12 MMOL/L (ref 5–15)
AST SERPL-CCNC: 19 U/L (ref 1–40)
BASOPHILS # BLD AUTO: 0.06 10*3/MM3 (ref 0–0.2)
BASOPHILS NFR BLD AUTO: 1 % (ref 0–1.5)
BILIRUB SERPL-MCNC: 0.5 MG/DL (ref 0–1.2)
BUN SERPL-MCNC: 14 MG/DL (ref 8–23)
BUN/CREAT SERPL: 10.8 (ref 7–25)
CALCIUM SPEC-SCNC: 8.8 MG/DL (ref 8.6–10.5)
CHLORIDE SERPL-SCNC: 105 MMOL/L (ref 98–107)
CO2 SERPL-SCNC: 22 MMOL/L (ref 22–29)
CREAT SERPL-MCNC: 1.3 MG/DL (ref 0.76–1.27)
DEPRECATED RDW RBC AUTO: 43.2 FL (ref 37–54)
EOSINOPHIL # BLD AUTO: 0.15 10*3/MM3 (ref 0–0.4)
EOSINOPHIL NFR BLD AUTO: 2.5 % (ref 0.3–6.2)
ERYTHROCYTE [DISTWIDTH] IN BLOOD BY AUTOMATED COUNT: 13.8 % (ref 12.3–15.4)
FOLATE SERPL-MCNC: 18.8 NG/ML (ref 4.78–24.2)
GFR SERPL CREATININE-BSD FRML MDRD: 55 ML/MIN/1.73
GLOBULIN UR ELPH-MCNC: 2.8 GM/DL
GLUCOSE SERPL-MCNC: 92 MG/DL (ref 65–99)
HBA1C MFR BLD: 5.29 % (ref 4.8–5.6)
HCT VFR BLD AUTO: 49.6 % (ref 37.5–51)
HGB BLD-MCNC: 16.8 G/DL (ref 13–17.7)
IMM GRANULOCYTES # BLD AUTO: 0.02 10*3/MM3 (ref 0–0.05)
IMM GRANULOCYTES NFR BLD AUTO: 0.3 % (ref 0–0.5)
LYMPHOCYTES # BLD AUTO: 1.28 10*3/MM3 (ref 0.7–3.1)
LYMPHOCYTES NFR BLD AUTO: 21.4 % (ref 19.6–45.3)
MAGNESIUM SERPL-MCNC: 2.1 MG/DL (ref 1.6–2.4)
MCH RBC QN AUTO: 29.5 PG (ref 26.6–33)
MCHC RBC AUTO-ENTMCNC: 33.9 G/DL (ref 31.5–35.7)
MCV RBC AUTO: 87.2 FL (ref 79–97)
MONOCYTES # BLD AUTO: 0.68 10*3/MM3 (ref 0.1–0.9)
MONOCYTES NFR BLD AUTO: 11.4 % (ref 5–12)
NEUTROPHILS NFR BLD AUTO: 3.79 10*3/MM3 (ref 1.7–7)
NEUTROPHILS NFR BLD AUTO: 63.4 % (ref 42.7–76)
NRBC BLD AUTO-RTO: 0 /100 WBC (ref 0–0.2)
PHOSPHATE SERPL-MCNC: 2 MG/DL (ref 2.5–4.5)
PLATELET # BLD AUTO: 271 10*3/MM3 (ref 140–450)
PMV BLD AUTO: 10.7 FL (ref 6–12)
POTASSIUM SERPL-SCNC: 4 MMOL/L (ref 3.5–5.2)
PROT SERPL-MCNC: 6.7 G/DL (ref 6–8.5)
RBC # BLD AUTO: 5.69 10*6/MM3 (ref 4.14–5.8)
SODIUM SERPL-SCNC: 139 MMOL/L (ref 136–145)
TSH SERPL DL<=0.05 MIU/L-ACNC: 0.51 UIU/ML (ref 0.27–4.2)
URATE SERPL-MCNC: 7.4 MG/DL (ref 3.4–7)
VIT B12 BLD-MCNC: 1293 PG/ML (ref 211–946)
WBC NRBC COR # BLD: 5.98 10*3/MM3 (ref 3.4–10.8)

## 2021-12-23 PROCEDURE — 84443 ASSAY THYROID STIM HORMONE: CPT

## 2021-12-23 PROCEDURE — 84402 ASSAY OF FREE TESTOSTERONE: CPT

## 2021-12-23 PROCEDURE — 82306 VITAMIN D 25 HYDROXY: CPT

## 2021-12-23 PROCEDURE — 83704 LIPOPROTEIN BLD QUAN PART: CPT

## 2021-12-23 PROCEDURE — 84550 ASSAY OF BLOOD/URIC ACID: CPT

## 2021-12-23 PROCEDURE — 83735 ASSAY OF MAGNESIUM: CPT

## 2021-12-23 PROCEDURE — 80053 COMPREHEN METABOLIC PANEL: CPT

## 2021-12-23 PROCEDURE — 84100 ASSAY OF PHOSPHORUS: CPT

## 2021-12-23 PROCEDURE — 82607 VITAMIN B-12: CPT

## 2021-12-23 PROCEDURE — 84154 ASSAY OF PSA FREE: CPT

## 2021-12-23 PROCEDURE — 80061 LIPID PANEL: CPT

## 2021-12-23 PROCEDURE — 85025 COMPLETE CBC W/AUTO DIFF WBC: CPT

## 2021-12-23 PROCEDURE — 36415 COLL VENOUS BLD VENIPUNCTURE: CPT

## 2021-12-23 PROCEDURE — 84153 ASSAY OF PSA TOTAL: CPT

## 2021-12-23 PROCEDURE — 83036 HEMOGLOBIN GLYCOSYLATED A1C: CPT

## 2021-12-23 PROCEDURE — 82746 ASSAY OF FOLIC ACID SERUM: CPT

## 2021-12-23 PROCEDURE — 84403 ASSAY OF TOTAL TESTOSTERONE: CPT

## 2021-12-24 LAB
PSA FREE MFR SERPL: 30.5 %
PSA FREE SERPL-MCNC: 0.58 NG/ML
PSA SERPL-MCNC: 1.9 NG/ML (ref 0–4)

## 2021-12-26 LAB
CHOLEST SERPL-MCNC: 181 MG/DL (ref 100–199)
HDL SERPL-SCNC: 32.2 UMOL/L
HDLC SERPL-MCNC: 48 MG/DL
LDL SERPL QN: 20.7 NM
LDL SERPL-SCNC: 1477 NMOL/L
LDL SMALL SERPL-SCNC: 796 NMOL/L
LDLC SERPL CALC-MCNC: 109 MG/DL (ref 0–99)
TRIGL SERPL-MCNC: 135 MG/DL (ref 0–149)

## 2021-12-28 LAB
TESTOST FREE SERPL-MCNC: 17.3 PG/ML (ref 6.6–18.1)
TESTOST SERPL-MCNC: 686 NG/DL (ref 264–916)

## 2022-02-21 ENCOUNTER — LAB (OUTPATIENT)
Dept: UROLOGY | Facility: CLINIC | Age: 67
End: 2022-02-21

## 2022-02-21 DIAGNOSIS — E29.1 HYPOGONADISM IN MALE: Primary | ICD-10-CM

## 2022-02-22 LAB
HCT VFR BLD AUTO: 46.2 % (ref 37.5–51)
HGB BLD-MCNC: 15.9 G/DL (ref 13–17.7)
TESTOST SERPL-MCNC: 272 NG/DL (ref 264–916)

## 2022-04-28 ENCOUNTER — PROCEDURE VISIT (OUTPATIENT)
Dept: UROLOGY | Facility: CLINIC | Age: 67
End: 2022-04-28

## 2022-04-28 DIAGNOSIS — E29.1 HYPOGONADISM IN MALE: Primary | ICD-10-CM

## 2022-04-28 PROCEDURE — 11980 IMPLANT HORMONE PELLET(S): CPT | Performed by: UROLOGY

## 2022-04-28 PROCEDURE — S0189 TESTOSTERONE PELLET 75 MG: HCPCS | Performed by: UROLOGY

## 2022-04-28 NOTE — PROGRESS NOTES
OFFICE PROCEDURE NOTE      PREPROCEDURE DIAGNOSIS:  Hypogonadism.      POSTPROCEDURE DIAGNOSIS:  Hypogonadism.      PROCEDURE:  Testopel (NDC# 42312-812-01) insertion/implant in Left flank, 11 pellets implanted.      SURGEON:    Ken Be MD    ANESTHESIA:  Local.      DRAINS:  None.     Labs reviewed,       The risks/benefits of the procedure were discussed with the patient.     DESCRIPTION OF PROCEDURE: The patient was placed in the lateral decubitus position and his skin was prepped with chlorhexadine solution. 10 ml of 1% lidocaine w/epinephrine were instilled subcutaneously in a straight fashion. A 3mm skin puncture was made with an 11 blade. The trochar was placed subcutaneously along the line with ease and without patient discomfort. The sharp stylet was removed and the pellets were placed along the track and positioned using the blunt stylet. Following this,  a steri strip was used to close the puncture wound. A 4/4 gauze pad was placed over the wound and a Tegaderm bandage was applied and the patient was repositioned on his opposite side for compression purposes with a roll.      PLAN:  The patient was discharged in stable condition to be followed up with serial serum testosterone levels. Follow up for next visit in 4 months.  Testosterone, hematocrit, and PSA ordered for 2 months

## 2022-08-19 ENCOUNTER — LAB (OUTPATIENT)
Dept: UROLOGY | Facility: CLINIC | Age: 67
End: 2022-08-19

## 2022-08-29 ENCOUNTER — PROCEDURE VISIT (OUTPATIENT)
Dept: UROLOGY | Facility: CLINIC | Age: 67
End: 2022-08-29

## 2022-08-29 DIAGNOSIS — E29.1 HYPOGONADISM IN MALE: Primary | ICD-10-CM

## 2022-08-29 PROCEDURE — 11980 IMPLANT HORMONE PELLET(S): CPT | Performed by: UROLOGY

## 2022-08-29 PROCEDURE — S0189 TESTOSTERONE PELLET 75 MG: HCPCS | Performed by: UROLOGY

## 2022-08-29 NOTE — PROGRESS NOTES
OFFICE PROCEDURE NOTE      PREPROCEDURE DIAGNOSIS:  Hypogonadism.      POSTPROCEDURE DIAGNOSIS:  Hypogonadism.      PROCEDURE:  Testopel (NDC# 94570-131-38) insertion/implant in Right flank, 11 pellets implanted.      SURGEON:    Ken Be MD    ANESTHESIA:  Local.      Labs reviewed     The risks/benefits of the procedure were discussed with the patient.     DESCRIPTION OF PROCEDURE: The patient was placed in the lateral decubitus position and his skin was prepped with chlorhexadine solution. 10 ml of 1% lidocaine w/epinephrine were instilled subcutaneously in a straight fashion. A 3mm skin puncture was made with an 11 blade. The trochar was placed subcutaneously along the line with ease and without patient discomfort. The sharp stylet was removed and the pellets were placed along the track and positioned using the blunt stylet. Following this,  a steri strip was used to close the puncture wound. A 4/4 gauze pad was placed over the wound and a Tegaderm bandage was applied and the patient was repositioned on his opposite side for compression purposes with a roll.      PLAN:  The patient was discharged in stable condition to be followed up with serial serum testosterone levels. Follow up for next visit in 4 months.

## 2022-11-04 ENCOUNTER — LAB (OUTPATIENT)
Dept: UROLOGY | Facility: CLINIC | Age: 67
End: 2022-11-04

## 2022-12-29 ENCOUNTER — PROCEDURE VISIT (OUTPATIENT)
Dept: UROLOGY | Facility: CLINIC | Age: 67
End: 2022-12-29

## 2022-12-29 DIAGNOSIS — E29.1 HYPOGONADISM IN MALE: Primary | ICD-10-CM

## 2022-12-29 PROCEDURE — 11980 IMPLANT HORMONE PELLET(S): CPT | Performed by: UROLOGY

## 2022-12-29 PROCEDURE — S0189 TESTOSTERONE PELLET 75 MG: HCPCS | Performed by: UROLOGY

## 2022-12-29 NOTE — PROGRESS NOTES
OFFICE PROCEDURE NOTE      PREPROCEDURE DIAGNOSIS:  Hypogonadism.      POSTPROCEDURE DIAGNOSIS:  Hypogonadism.      PROCEDURE:  Testopel (NDC# 04356-593-51) insertion/implant in Left flank, 11 pellets implanted.      SURGEON:    Ken Be MD    ANESTHESIA:  Local.      Labs reviewed     The risks/benefits of the procedure were discussed with the patient.     DESCRIPTION OF PROCEDURE: The patient was placed in the lateral decubitus position and his skin was prepped with chlorhexadine solution. 10 ml of 1% lidocaine w/epinephrine were instilled subcutaneously in a straight fashion. A 3mm skin puncture was made with an 11 blade. The trochar was placed subcutaneously along the line with ease and without patient discomfort. The sharp stylet was removed and the pellets were placed along the track and positioned using the blunt stylet. Following this,  a steri strip was used to close the puncture wound. A 4/4 gauze pad was placed over the wound and a Tegaderm bandage was applied and the patient was repositioned on his opposite side for compression purposes with a roll.      PLAN:  The patient was discharged in stable condition to be followed up with serial serum testosterone levels. Follow up for next visit in 4 months.

## 2023-04-10 ENCOUNTER — PROCEDURE VISIT (OUTPATIENT)
Dept: UROLOGY | Facility: CLINIC | Age: 68
End: 2023-04-10
Payer: COMMERCIAL

## 2023-04-10 DIAGNOSIS — E29.1 HYPOGONADISM IN MALE: Primary | ICD-10-CM

## 2023-04-10 PROCEDURE — 11980 IMPLANT HORMONE PELLET(S): CPT | Performed by: UROLOGY

## 2023-04-10 NOTE — PROGRESS NOTES
OFFICE PROCEDURE NOTE      PREPROCEDURE DIAGNOSIS:  Hypogonadism.      POSTPROCEDURE DIAGNOSIS:  Hypogonadism.      PROCEDURE:  Testopel (NDC# 46592-259-27) insertion/implant in Right flank,  11 pellets implanted.      SURGEON:    Ken Be MD    ANESTHESIA:  Local.      Labs reviewed     The risks/benefits of the procedure were discussed with the patient.     DESCRIPTION OF PROCEDURE: The patient was placed in the lateral decubitus position and his skin was prepped with chlorhexadine solution. 10 ml of 1% lidocaine w/epinephrine were instilled subcutaneously in a straight fashion. A 3mm skin puncture was made with an 11 blade. The trochar was placed subcutaneously along the line with ease and without patient discomfort. The sharp stylet was removed and the pellets were placed along the track and positioned using the blunt stylet. Following this,  a steri strip was used to close the puncture wound. A 4/4 gauze pad was placed over the wound and a Tegaderm bandage was applied and the patient was repositioned on his opposite side for compression purposes with a roll.      PLAN:  The patient was discharged in stable condition to be followed up with serial serum testosterone levels. Follow up for next visit in 4 months.

## 2023-04-13 ENCOUNTER — TRANSCRIBE ORDERS (OUTPATIENT)
Dept: ADMINISTRATIVE | Facility: HOSPITAL | Age: 68
End: 2023-04-13
Payer: COMMERCIAL

## 2023-04-13 DIAGNOSIS — R09.89 BRUIT: Primary | ICD-10-CM

## 2023-04-13 DIAGNOSIS — R42 DIZZINESS: ICD-10-CM

## 2023-05-05 ENCOUNTER — HOSPITAL ENCOUNTER (OUTPATIENT)
Dept: ULTRASOUND IMAGING | Facility: HOSPITAL | Age: 68
Discharge: HOME | End: 2023-05-05
Payer: COMMERCIAL

## 2023-05-05 DIAGNOSIS — R09.89 BRUIT: ICD-10-CM

## 2023-05-05 DIAGNOSIS — R42 DIZZINESS: ICD-10-CM

## 2023-05-05 PROCEDURE — 93880 EXTRACRANIAL BILAT STUDY: CPT

## 2023-06-23 ENCOUNTER — TELEPHONE (OUTPATIENT)
Dept: UROLOGY | Facility: CLINIC | Age: 68
End: 2023-06-23

## 2023-06-23 NOTE — TELEPHONE ENCOUNTER
Caller: SHOAIB REAL    Relationship: SELF     Best call back number: [phone]    Patient is needing: NEEDS TO SCHEDULE HIS NEXT TESTOPEL IN OFFICE PROCEDURE.

## 2023-09-14 ENCOUNTER — PROCEDURE VISIT (OUTPATIENT)
Dept: UROLOGY | Facility: CLINIC | Age: 68
End: 2023-09-14
Payer: MEDICARE

## 2023-09-14 DIAGNOSIS — E29.1 HYPOGONADISM IN MALE: Primary | ICD-10-CM

## 2023-09-14 NOTE — PROGRESS NOTES
OFFICE PROCEDURE NOTE      PREPROCEDURE DIAGNOSIS:  Hypogonadism.      POSTPROCEDURE DIAGNOSIS:  Hypogonadism.      PROCEDURE:  Testopel (NDC# 84015-501-58) insertion/implant in flank,  11 pellets implanted.      SURGEON:    Ken Be MD    ANESTHESIA:  Local.      Labs reviewed     The risks/benefits of the procedure were discussed with the patient.     DESCRIPTION OF PROCEDURE: The patient was placed in the lateral decubitus position and his skin was prepped with chlorhexadine solution. 10 ml of 1% lidocaine w/epinephrine were instilled subcutaneously in a straight fashion. A 3mm skin puncture was made with an 11 blade. The trochar was placed subcutaneously along the line with ease and without patient discomfort. The sharp stylet was removed and the pellets were placed along the track and positioned using the blunt stylet. Following this,  a steri strip was used to close the puncture wound. A 4/4 gauze pad was placed over the wound and a Tegaderm bandage was applied and the patient was repositioned on his opposite side for compression purposes with a roll.      PLAN:  The patient was discharged in stable condition to be followed up with serial serum testosterone levels. Follow up for next visit in 4 months.

## 2023-12-05 ENCOUNTER — OFFICE VISIT (OUTPATIENT)
Dept: PULMONOLOGY | Facility: CLINIC | Age: 68
End: 2023-12-05
Payer: MEDICARE

## 2023-12-05 VITALS
SYSTOLIC BLOOD PRESSURE: 124 MMHG | WEIGHT: 228 LBS | HEART RATE: 81 BPM | HEIGHT: 66 IN | BODY MASS INDEX: 36.64 KG/M2 | OXYGEN SATURATION: 94 % | DIASTOLIC BLOOD PRESSURE: 74 MMHG | RESPIRATION RATE: 14 BRPM

## 2023-12-05 DIAGNOSIS — G47.33 OBSTRUCTIVE SLEEP APNEA: Primary | ICD-10-CM

## 2023-12-05 PROCEDURE — 3078F DIAST BP <80 MM HG: CPT | Performed by: INTERNAL MEDICINE

## 2023-12-05 PROCEDURE — 99204 OFFICE O/P NEW MOD 45 MIN: CPT | Performed by: INTERNAL MEDICINE

## 2023-12-05 PROCEDURE — 3074F SYST BP LT 130 MM HG: CPT | Performed by: INTERNAL MEDICINE

## 2023-12-05 NOTE — PROGRESS NOTES
CONSULT NOTE    Requested by:   Anthony Mcdermott*   Anthony Mcdermott MD      Chief Complaint   Patient presents with    Consult    Sleeping Problem       Subjective:  Jonnie Hollis is a 68 y.o. male.   Patient comes in today for consultation because of sleep apnea.  The patient says that  he was diagnosed with sleep apnea 7 years ago.      It appears that he has been on a PAP device since then.     he had a recalled device and was given a replacement device instead of the recalled device in March 2023.     he feels that the PAP device occasionally does not function properly.     Patient reports continued improvement with the use of the PAP device, although the device is making a noise that makes it hard to use it at night.     The noise is loud enough that his wife is bothered by it.    Patient is not aware of snoring through the device.     The patient describes no significant issues with his mask either.     Patient is under management for hypertension & CVA      The following portions of the patient's history were reviewed and updated as appropriate: allergies, current medications, past family history, past medical history, past social history, and past surgical history.    Review of Systems   Constitutional:  Negative for chills, fatigue and fever.   HENT:  Negative for sinus pressure, sneezing and sore throat.    Respiratory:  Negative for cough, chest tightness, shortness of breath and wheezing.    Psychiatric/Behavioral:  Positive for sleep disturbance.    All other systems reviewed and are negative.      Past Medical History:   Diagnosis Date    Arthritis     Colon polyps     Hypertension     Kidney stone     Kidney stones     Lithotripsy in past    Stroke     Stroke (cerebrum)        Social History     Tobacco Use    Smoking status: Never    Smokeless tobacco: Never   Substance Use Topics    Alcohol use: Yes     Alcohol/week: 5.0 standard drinks of alcohol     Types: 5 Cans of beer  "per week         Objective:  Visit Vitals  /74   Pulse 81   Resp 14   Ht 167.6 cm (66\") Comment: pt reported   Wt 103 kg (228 lb)   SpO2 94%   BMI 36.80 kg/m²       Physical Exam  Vitals reviewed.   Constitutional:       Appearance: He is well-developed.   HENT:      Head: Atraumatic.      Mouth/Throat:      Mouth: Mucous membranes are moist.      Comments: Oropharynx was crowded.  Eyes:      Pupils: Pupils are equal, round, and reactive to light.   Neck:      Thyroid: No thyromegaly.      Vascular: No JVD.      Trachea: No tracheal deviation.   Cardiovascular:      Rate and Rhythm: Normal rate and regular rhythm.   Pulmonary:      Effort: Pulmonary effort is normal. No respiratory distress.      Breath sounds: Normal breath sounds. No wheezing.   Musculoskeletal:      Right lower leg: No edema.      Left lower leg: No edema.      Comments: Gait was normal.   Skin:     General: Skin is warm and dry.   Neurological:      Mental Status: He is alert and oriented to person, place, and time.   Psychiatric:         Mood and Affect: Mood normal.         Behavior: Behavior normal.         Assessment/Plan:  Diagnoses and all orders for this visit:    1. Obstructive sleep apnea (Primary)  -     BIPAP / CPAP Adjustment        Return in about 5 months (around 5/5/2024) for Leonardo/Rocio.    DISCUSSION(if any):  Laboratory workup was also reviewed which showed   Lab Results   Component Value Date    CO2 22.0 12/23/2021     Laboratory workup also showed   Lab Results   Component Value Date    HGB 15.5 07/07/2023    HGB 15.2 11/04/2022    HGB 15.3 08/19/2022   ,   Lab Results   Component Value Date    HCT 44.7 07/07/2023    HCT 44.3 11/04/2022    HCT 44.6 08/19/2022   ,   Lab Results   Component Value Date    TSH 0.514 12/23/2021     Referring physicians office note was also reviewed that did mention sleep apnea    ===========================  ===========================    I was able to review the results of last in-lab " sleep study in detail. It was performed in 10/2016. I informed him that the apnea hypopnea index was 14.4/hour. Supine AHI was 38/hour.    Current DME: [employer]  Current PAP Settings: ?  Current mask: Nasal mask.    I told the patient that his symptoms are consistent with poorly controlled sleep apnea.    Patient was advised to continue using PAP for at least 4 hours every night.    We will ask his DME company to assess his device.     If the symptoms do not improve, even after above-mentioned measures, then he may have to undergo a full night titration OR a split night study.    Patient was advised to call this office with any issues.    Vaccination status addressed.    Up-to-date with influenza vaccinations.     Declines pneumonia vaccinations.     It was recommended that the patient consider Prevnar-20 vaccination and discuss it with his PCP, if not already obtained.     For the vaccines, that he refused today, I have asked him to discuss this further with his PCP.    A total of 52 minutes was spent reviewing all the information available including reviewing previous history from PCP/sleep specialist, as applicable, reviewing available sleep studies/compliance data.  This also included discussion regarding importance of sleep hygiene and possible lifestyle modifications, if applicable/necessary, that may impact sleep.  Time was also spent documenting information in electronic health record and placing orders, as appropriate and applicable.        Dictated utilizing Dragon dictation.    This document was electronically signed by John Baum MD on 12/05/23 at 14:31 EST

## 2023-12-08 ENCOUNTER — TELEPHONE (OUTPATIENT)
Dept: PULMONOLOGY | Facility: CLINIC | Age: 68
End: 2023-12-08

## 2023-12-08 NOTE — TELEPHONE ENCOUNTER
Caller: Jonnie Hollis    Relationship: Self    Best call back number: [phone]    What orders are you requesting (i.e. lab or imaging): NEW SLEEP MACHINE ORDERS - PATIENT STATED HE WAS SEEN AT [facility] PER  INSTRUCTIONS AND THEY TOLD HIM THEY WOULD NEED NEW ORDERS SENT. PER PATIENT,  WOULD BE SETTING HIM UP WITH A NEW MACHINE THAT IS QUIETER THAN HIS CURRENT [employer] MACHINE.     Where will you receive your lab/imaging services: [facility]

## 2023-12-21 ENCOUNTER — PROCEDURE VISIT (OUTPATIENT)
Dept: UROLOGY | Facility: CLINIC | Age: 68
End: 2023-12-21
Payer: MEDICARE

## 2023-12-21 DIAGNOSIS — E29.1 HYPOGONADISM IN MALE: Primary | ICD-10-CM

## 2023-12-21 DIAGNOSIS — D75.1 POLYCYTHEMIA: ICD-10-CM

## 2023-12-21 RX ORDER — SODIUM CHLORIDE 9 MG/ML
250 INJECTION, SOLUTION INTRAVENOUS ONCE
OUTPATIENT
Start: 2023-12-21

## 2023-12-21 NOTE — PROGRESS NOTES
OFFICE PROCEDURE NOTE      PREPROCEDURE DIAGNOSIS:  Hypogonadism.      POSTPROCEDURE DIAGNOSIS:  Hypogonadism.      PROCEDURE:  Testopel (NDC# 12231-782-50) insertion/implant in flank,  11 pellets implanted.      SURGEON:    Ken Be MD    ANESTHESIA:  Local.      Labs reviewed     The risks/benefits of the procedure were discussed with the patient.     DESCRIPTION OF PROCEDURE: The patient was placed in the lateral decubitus position and his skin was prepped with chlorhexadine solution. 10 ml of 1% lidocaine w/epinephrine were instilled subcutaneously in a straight fashion. A 3mm skin puncture was made with an 11 blade. The trochar was placed subcutaneously along the line with ease and without patient discomfort. The sharp stylet was removed and the pellets were placed along the track and positioned using the blunt stylet. Following this,  a steri strip was used to close the puncture wound. A 4/4 gauze pad was placed over the wound and a Tegaderm bandage was applied and the patient was repositioned on his opposite side for compression purposes with a roll.      PLAN:  The patient was discharged in stable condition to be followed up with serial serum testosterone levels. Follow up for next visit in 4 months.  He needs to start getting phlebotomy.

## 2023-12-27 ENCOUNTER — TELEPHONE (OUTPATIENT)
Dept: PULMONOLOGY | Facility: CLINIC | Age: 68
End: 2023-12-27

## 2023-12-27 DIAGNOSIS — G47.33 OSA (OBSTRUCTIVE SLEEP APNEA): Primary | ICD-10-CM

## 2023-12-27 NOTE — TELEPHONE ENCOUNTER
Caller: Jonnie Hollis    Relationship: Self    Best call back number: [phone]    Equipment requested: CPAP MACHINE    Reason for the request: PT SAYS IT'S NOISY AND IT KEEPS HIM UP AT NIGHT AND ROTECH STATED THAT HE NEEDS A NEW ONE AND A NEW BRAND.     Prescribing Provider: DR. MORALES TUTTLE    Additional information or concerns: N/A

## 2024-01-03 ENCOUNTER — TRANSCRIBE ORDERS (OUTPATIENT)
Dept: ADMINISTRATIVE | Facility: HOSPITAL | Age: 69
End: 2024-01-03
Payer: COMMERCIAL

## 2024-01-03 DIAGNOSIS — M25.551 RIGHT HIP PAIN: Primary | ICD-10-CM

## 2024-01-03 DIAGNOSIS — M54.50 LOW BACK PAIN, UNSPECIFIED BACK PAIN LATERALITY, UNSPECIFIED CHRONICITY, UNSPECIFIED WHETHER SCIATICA PRESENT: ICD-10-CM

## 2024-01-03 DIAGNOSIS — R53.1 WEAKNESS: ICD-10-CM

## 2024-01-18 DIAGNOSIS — G47.33 OSA (OBSTRUCTIVE SLEEP APNEA): Primary | ICD-10-CM

## 2024-01-19 ENCOUNTER — INFUSION (OUTPATIENT)
Dept: ONCOLOGY | Facility: HOSPITAL | Age: 69
End: 2024-01-19
Payer: COMMERCIAL

## 2024-01-19 VITALS
SYSTOLIC BLOOD PRESSURE: 143 MMHG | OXYGEN SATURATION: 98 % | RESPIRATION RATE: 18 BRPM | TEMPERATURE: 98 F | DIASTOLIC BLOOD PRESSURE: 86 MMHG | HEART RATE: 68 BPM | BODY MASS INDEX: 36.82 KG/M2 | WEIGHT: 228.1 LBS

## 2024-01-19 DIAGNOSIS — D75.1 POLYCYTHEMIA: Primary | ICD-10-CM

## 2024-01-19 LAB
HCT VFR BLD AUTO: 44.7 % (ref 37.5–51)
HGB BLD-MCNC: 15.7 G/DL (ref 13–17.7)

## 2024-01-19 PROCEDURE — 36415 COLL VENOUS BLD VENIPUNCTURE: CPT

## 2024-01-19 PROCEDURE — 85014 HEMATOCRIT: CPT

## 2024-01-19 PROCEDURE — G0463 HOSPITAL OUTPT CLINIC VISIT: HCPCS

## 2024-01-19 PROCEDURE — 85018 HEMOGLOBIN: CPT

## 2024-01-19 RX ORDER — SODIUM CHLORIDE 9 MG/ML
250 INJECTION, SOLUTION INTRAVENOUS ONCE
Status: DISCONTINUED | OUTPATIENT
Start: 2024-01-19 | End: 2024-01-19 | Stop reason: HOSPADM

## 2024-01-19 RX ORDER — SODIUM CHLORIDE 9 MG/ML
250 INJECTION, SOLUTION INTRAVENOUS ONCE
OUTPATIENT
Start: 2024-01-21

## 2024-05-10 ENCOUNTER — TELEPHONE (OUTPATIENT)
Dept: PULMONOLOGY | Facility: CLINIC | Age: 69
End: 2024-05-10
Payer: COMMERCIAL

## 2024-07-18 ENCOUNTER — OFFICE VISIT (OUTPATIENT)
Dept: PULMONOLOGY | Facility: CLINIC | Age: 69
End: 2024-07-18
Payer: COMMERCIAL

## 2024-07-18 VITALS
SYSTOLIC BLOOD PRESSURE: 122 MMHG | RESPIRATION RATE: 14 BRPM | OXYGEN SATURATION: 94 % | BODY MASS INDEX: 35.84 KG/M2 | HEART RATE: 75 BPM | DIASTOLIC BLOOD PRESSURE: 76 MMHG | HEIGHT: 66 IN | WEIGHT: 223 LBS

## 2024-07-18 DIAGNOSIS — G47.33 OSA (OBSTRUCTIVE SLEEP APNEA): Primary | ICD-10-CM

## 2024-07-18 PROCEDURE — 99212 OFFICE O/P EST SF 10 MIN: CPT | Performed by: NURSE PRACTITIONER

## 2024-07-18 NOTE — PROGRESS NOTES
"Chief Complaint   Patient presents with    Sleeping Problem    Follow-up         Subjective   Jonnie Hollis is a 68 y.o. male.   Patient comes back today for follow up of Obstructive Sleep apnea.     Patient says that he is compliant with his device and using it regularly.    Patient's symptoms of sleep disturbance and daytime sleepiness have been helped greatly with the use of PAP device, as prescribed.  He feels rested most days upon awakening.     He received a new machine a few months ago.       The following portions of the patient's history were reviewed and updated as appropriate: allergies, current medications, past family history, past medical history, past social history, and past surgical history.      Review of Systems   HENT:  Negative for sinus pressure, sneezing and sore throat.    Respiratory:  Negative for cough, chest tightness, shortness of breath and wheezing.        Objective   Visit Vitals  /76   Pulse 75   Resp 14   Ht 167.6 cm (66\") Comment: pt reported   Wt 101 kg (223 lb)   SpO2 94%   BMI 35.99 kg/m²         Physical Exam  Vitals reviewed.   Constitutional:       Appearance: He is well-developed.   HENT:      Head: Atraumatic.      Mouth/Throat:      Mouth: Mucous membranes are moist.      Comments: Crowded oropharynx.  Eyes:      Extraocular Movements: Extraocular movements intact.   Cardiovascular:      Rate and Rhythm: Normal rate and regular rhythm.   Skin:     General: Skin is warm.   Neurological:      Mental Status: He is alert and oriented to person, place, and time.           Assessment & Plan   Diagnoses and all orders for this visit:    1. TYLOR (obstructive sleep apnea) (Primary)           Return in about 13 years (around 7/18/2037) for For Dr. Baum, Sleep ONLY.    DISCUSSION (if any):  Sleep study 2016  AHI 14.4 per hour, Supine AHI 38 per hour  [facility]  Current PAP Settings: 9.5 cm  Current mask: Nasal mask    Continue treatment with CPAP at a pressure " of 9.5, with a nasal mask.    Patient's compliance data was reviewed and the compliance is 100%.    Humidification setup, hose and mask care discussed.    Weight loss advised.    Use every night for at least 4 hours stressed.     I have advised him to get pneumococcal vaccine.     Dictated utilizing Dragon dictation.    This document was electronically signed by JAMI Purcell July 18, 2024  13:57 EDT

## 2025-03-26 ENCOUNTER — APPOINTMENT (OUTPATIENT)
Dept: MRI IMAGING | Facility: HOSPITAL | Age: 70
End: 2025-03-26
Payer: COMMERCIAL

## 2025-03-26 ENCOUNTER — HOSPITAL ENCOUNTER (OUTPATIENT)
Facility: HOSPITAL | Age: 70
Setting detail: OBSERVATION
Discharge: HOME | End: 2025-03-27
Attending: EMERGENCY MEDICINE | Admitting: HOSPITALIST
Payer: COMMERCIAL

## 2025-03-26 ENCOUNTER — APPOINTMENT (OUTPATIENT)
Dept: CT IMAGING | Facility: HOSPITAL | Age: 70
End: 2025-03-26
Payer: COMMERCIAL

## 2025-03-26 DIAGNOSIS — I63.9 ACUTE ISCHEMIC STROKE: Primary | ICD-10-CM

## 2025-03-26 DIAGNOSIS — I63.9 OCCIPITAL INFARCTION: ICD-10-CM

## 2025-03-26 LAB
ALBUMIN SERPL-MCNC: 4 G/DL (ref 3.5–5.2)
ALBUMIN/GLOB SERPL: 1.5 G/DL
ALP SERPL-CCNC: 80 U/L (ref 39–117)
ALT SERPL W P-5'-P-CCNC: 22 U/L (ref 1–41)
ANION GAP SERPL CALCULATED.3IONS-SCNC: 12 MMOL/L (ref 5–15)
AST SERPL-CCNC: 20 U/L (ref 1–40)
BASOPHILS # BLD AUTO: 0.03 10*3/MM3 (ref 0–0.2)
BASOPHILS NFR BLD AUTO: 0.5 % (ref 0–1.5)
BILIRUB SERPL-MCNC: 0.4 MG/DL (ref 0–1.2)
BUN SERPL-MCNC: 15 MG/DL (ref 8–23)
BUN/CREAT SERPL: 12.5 (ref 7–25)
CALCIUM SPEC-SCNC: 9.1 MG/DL (ref 8.6–10.5)
CHLORIDE SERPL-SCNC: 102 MMOL/L (ref 98–107)
CO2 SERPL-SCNC: 25 MMOL/L (ref 22–29)
CREAT SERPL-MCNC: 1.2 MG/DL (ref 0.76–1.27)
DEPRECATED RDW RBC AUTO: 40.4 FL (ref 37–54)
EGFRCR SERPLBLD CKD-EPI 2021: 65.9 ML/MIN/1.73
EOSINOPHIL # BLD AUTO: 0.1 10*3/MM3 (ref 0–0.4)
EOSINOPHIL NFR BLD AUTO: 1.7 % (ref 0.3–6.2)
ERYTHROCYTE [DISTWIDTH] IN BLOOD BY AUTOMATED COUNT: 13.2 % (ref 12.3–15.4)
GLOBULIN UR ELPH-MCNC: 2.6 GM/DL
GLUCOSE BLDC GLUCOMTR-MCNC: 127 MG/DL (ref 70–130)
GLUCOSE SERPL-MCNC: 86 MG/DL (ref 65–99)
HCT VFR BLD AUTO: 42.5 % (ref 37.5–51)
HGB BLD-MCNC: 14.6 G/DL (ref 13–17.7)
IMM GRANULOCYTES # BLD AUTO: 0.03 10*3/MM3 (ref 0–0.05)
IMM GRANULOCYTES NFR BLD AUTO: 0.5 % (ref 0–0.5)
LYMPHOCYTES # BLD AUTO: 0.96 10*3/MM3 (ref 0.7–3.1)
LYMPHOCYTES NFR BLD AUTO: 16.3 % (ref 19.6–45.3)
MAGNESIUM SERPL-MCNC: 2 MG/DL (ref 1.6–2.4)
MCH RBC QN AUTO: 29 PG (ref 26.6–33)
MCHC RBC AUTO-ENTMCNC: 34.4 G/DL (ref 31.5–35.7)
MCV RBC AUTO: 84.5 FL (ref 79–97)
MONOCYTES # BLD AUTO: 0.89 10*3/MM3 (ref 0.1–0.9)
MONOCYTES NFR BLD AUTO: 15.1 % (ref 5–12)
NEUTROPHILS NFR BLD AUTO: 3.89 10*3/MM3 (ref 1.7–7)
NEUTROPHILS NFR BLD AUTO: 65.9 % (ref 42.7–76)
NRBC BLD AUTO-RTO: 0 /100 WBC (ref 0–0.2)
PLATELET # BLD AUTO: 236 10*3/MM3 (ref 140–450)
PMV BLD AUTO: 9.8 FL (ref 6–12)
POTASSIUM SERPL-SCNC: 3.6 MMOL/L (ref 3.5–5.2)
PROT SERPL-MCNC: 6.6 G/DL (ref 6–8.5)
RBC # BLD AUTO: 5.03 10*6/MM3 (ref 4.14–5.8)
SODIUM SERPL-SCNC: 139 MMOL/L (ref 136–145)
WBC NRBC COR # BLD AUTO: 5.9 10*3/MM3 (ref 3.4–10.8)

## 2025-03-26 PROCEDURE — 83735 ASSAY OF MAGNESIUM: CPT | Performed by: EMERGENCY MEDICINE

## 2025-03-26 PROCEDURE — 25510000001 IOPAMIDOL PER 1 ML: Performed by: FAMILY MEDICINE

## 2025-03-26 PROCEDURE — G0378 HOSPITAL OBSERVATION PER HR: HCPCS

## 2025-03-26 PROCEDURE — 99285 EMERGENCY DEPT VISIT HI MDM: CPT

## 2025-03-26 PROCEDURE — 82948 REAGENT STRIP/BLOOD GLUCOSE: CPT

## 2025-03-26 PROCEDURE — 99214 OFFICE O/P EST MOD 30 MIN: CPT | Performed by: NURSE PRACTITIONER

## 2025-03-26 PROCEDURE — 80053 COMPREHEN METABOLIC PANEL: CPT | Performed by: EMERGENCY MEDICINE

## 2025-03-26 PROCEDURE — 70551 MRI BRAIN STEM W/O DYE: CPT

## 2025-03-26 PROCEDURE — 70498 CT ANGIOGRAPHY NECK: CPT

## 2025-03-26 PROCEDURE — 36415 COLL VENOUS BLD VENIPUNCTURE: CPT

## 2025-03-26 PROCEDURE — 93005 ELECTROCARDIOGRAM TRACING: CPT | Performed by: EMERGENCY MEDICINE

## 2025-03-26 PROCEDURE — 99222 1ST HOSP IP/OBS MODERATE 55: CPT | Performed by: FAMILY MEDICINE

## 2025-03-26 PROCEDURE — 70496 CT ANGIOGRAPHY HEAD: CPT

## 2025-03-26 PROCEDURE — 85025 COMPLETE CBC W/AUTO DIFF WBC: CPT | Performed by: EMERGENCY MEDICINE

## 2025-03-26 PROCEDURE — 99291 CRITICAL CARE FIRST HOUR: CPT

## 2025-03-26 RX ORDER — CLOPIDOGREL BISULFATE 75 MG/1
75 TABLET ORAL DAILY
Status: DISCONTINUED | OUTPATIENT
Start: 2025-03-26 | End: 2025-03-27

## 2025-03-26 RX ORDER — ASPIRIN 300 MG/1
300 SUPPOSITORY RECTAL DAILY
Status: DISCONTINUED | OUTPATIENT
Start: 2025-03-26 | End: 2025-03-27

## 2025-03-26 RX ORDER — HYDROCHLOROTHIAZIDE 25 MG/1
12.5 TABLET ORAL DAILY
Status: DISCONTINUED | OUTPATIENT
Start: 2025-03-27 | End: 2025-03-27 | Stop reason: HOSPADM

## 2025-03-26 RX ORDER — NITROGLYCERIN 0.4 MG/1
0.4 TABLET SUBLINGUAL
Status: DISCONTINUED | OUTPATIENT
Start: 2025-03-26 | End: 2025-03-27 | Stop reason: HOSPADM

## 2025-03-26 RX ORDER — ACETAMINOPHEN 650 MG/1
650 SUPPOSITORY RECTAL EVERY 4 HOURS PRN
Status: DISCONTINUED | OUTPATIENT
Start: 2025-03-26 | End: 2025-03-27 | Stop reason: HOSPADM

## 2025-03-26 RX ORDER — LOSARTAN POTASSIUM 50 MG/1
50 TABLET ORAL EVERY 12 HOURS SCHEDULED
Status: DISCONTINUED | OUTPATIENT
Start: 2025-03-26 | End: 2025-03-27 | Stop reason: HOSPADM

## 2025-03-26 RX ORDER — ASPIRIN 81 MG/1
81 TABLET, CHEWABLE ORAL DAILY
Status: DISCONTINUED | OUTPATIENT
Start: 2025-03-26 | End: 2025-03-27 | Stop reason: HOSPADM

## 2025-03-26 RX ORDER — ACETAMINOPHEN 500 MG
500 TABLET ORAL EVERY 6 HOURS PRN
Status: DISCONTINUED | OUTPATIENT
Start: 2025-03-26 | End: 2025-03-27 | Stop reason: HOSPADM

## 2025-03-26 RX ORDER — AMOXICILLIN 250 MG
2 CAPSULE ORAL 2 TIMES DAILY PRN
Status: DISCONTINUED | OUTPATIENT
Start: 2025-03-26 | End: 2025-03-27 | Stop reason: HOSPADM

## 2025-03-26 RX ORDER — BISACODYL 5 MG/1
5 TABLET, DELAYED RELEASE ORAL DAILY PRN
Status: DISCONTINUED | OUTPATIENT
Start: 2025-03-26 | End: 2025-03-27 | Stop reason: HOSPADM

## 2025-03-26 RX ORDER — TERAZOSIN 5 MG/1
5 CAPSULE ORAL NIGHTLY
Status: DISCONTINUED | OUTPATIENT
Start: 2025-03-26 | End: 2025-03-27 | Stop reason: HOSPADM

## 2025-03-26 RX ORDER — ATORVASTATIN CALCIUM 40 MG/1
80 TABLET, FILM COATED ORAL NIGHTLY
Status: DISCONTINUED | OUTPATIENT
Start: 2025-03-26 | End: 2025-03-27 | Stop reason: HOSPADM

## 2025-03-26 RX ORDER — ACETAMINOPHEN 160 MG/5ML
500 SOLUTION ORAL EVERY 6 HOURS PRN
Status: DISCONTINUED | OUTPATIENT
Start: 2025-03-26 | End: 2025-03-27 | Stop reason: HOSPADM

## 2025-03-26 RX ORDER — SODIUM CHLORIDE 0.9 % (FLUSH) 0.9 %
10 SYRINGE (ML) INJECTION AS NEEDED
Status: DISCONTINUED | OUTPATIENT
Start: 2025-03-26 | End: 2025-03-27 | Stop reason: HOSPADM

## 2025-03-26 RX ORDER — FOLIC ACID 1 MG/1
1 TABLET ORAL DAILY
Status: DISCONTINUED | OUTPATIENT
Start: 2025-03-27 | End: 2025-03-27 | Stop reason: HOSPADM

## 2025-03-26 RX ORDER — FELODIPINE 5 MG/1
10 TABLET, EXTENDED RELEASE ORAL
Status: DISCONTINUED | OUTPATIENT
Start: 2025-03-27 | End: 2025-03-27

## 2025-03-26 RX ORDER — BISACODYL 10 MG
10 SUPPOSITORY, RECTAL RECTAL DAILY PRN
Status: DISCONTINUED | OUTPATIENT
Start: 2025-03-26 | End: 2025-03-27 | Stop reason: HOSPADM

## 2025-03-26 RX ORDER — POLYETHYLENE GLYCOL 3350 17 G/17G
17 POWDER, FOR SOLUTION ORAL DAILY PRN
Status: DISCONTINUED | OUTPATIENT
Start: 2025-03-26 | End: 2025-03-27 | Stop reason: HOSPADM

## 2025-03-26 RX ORDER — SODIUM CHLORIDE 0.9 % (FLUSH) 0.9 %
10 SYRINGE (ML) INJECTION EVERY 12 HOURS SCHEDULED
Status: DISCONTINUED | OUTPATIENT
Start: 2025-03-26 | End: 2025-03-27 | Stop reason: HOSPADM

## 2025-03-26 RX ORDER — SODIUM CHLORIDE 9 MG/ML
40 INJECTION, SOLUTION INTRAVENOUS AS NEEDED
Status: DISCONTINUED | OUTPATIENT
Start: 2025-03-26 | End: 2025-03-27 | Stop reason: HOSPADM

## 2025-03-26 RX ORDER — IOPAMIDOL 755 MG/ML
75 INJECTION, SOLUTION INTRAVASCULAR
Status: COMPLETED | OUTPATIENT
Start: 2025-03-27 | End: 2025-03-26

## 2025-03-26 RX ORDER — THIAMINE HYDROCHLORIDE 100 MG/ML
200 INJECTION, SOLUTION INTRAMUSCULAR; INTRAVENOUS EVERY 8 HOURS SCHEDULED
Status: DISCONTINUED | OUTPATIENT
Start: 2025-03-26 | End: 2025-03-26

## 2025-03-26 RX ADMIN — LOSARTAN POTASSIUM 50 MG: 50 TABLET, FILM COATED ORAL at 23:01

## 2025-03-26 RX ADMIN — ASPIRIN 81 MG: 81 TABLET, CHEWABLE ORAL at 19:14

## 2025-03-26 RX ADMIN — CLOPIDOGREL BISULFATE 75 MG: 75 TABLET, FILM COATED ORAL at 19:14

## 2025-03-26 RX ADMIN — Medication 10 ML: at 21:36

## 2025-03-26 RX ADMIN — ATORVASTATIN CALCIUM 80 MG: 40 TABLET, FILM COATED ORAL at 21:27

## 2025-03-26 RX ADMIN — IOPAMIDOL 75 ML: 755 INJECTION, SOLUTION INTRAVENOUS at 23:59

## 2025-03-26 NOTE — CONSULTS
"Stroke Consult Note    Patient Name: Jonnie Hollis   MRN: [id number]  Age: 69 y.o.  Sex: male  : 1955    Primary Care Physician: Anthony Mcdermott MD  Referring Physician:      TIME STROKE TEAM CALLED: 1930 EST     TIME PATIENT SEEN: 1930 EST    Handedness: Right  Race:     Chief Complaint/Reason for Consultation: \" I see gray blob in both eyes\"    HPI:   This is Mr. Jonnie Hollis is a 69-year-old white male, right-handed with significant health diagnosis for hypertension, hyperlipidemia, arthritis, ocular migraine, kidney stones who presents to [facility] ED accompanied by his wife as a referral from his eye doctor for evaluation of ocular migraine with visual deficit.  Neurology stroke is consulted at the ED as symptoms has been going on for 2 days.  MRI was conducted as a part of the ED workup that revealed right temporal occipital acute infarct.  CTA, head and neck ordered and pending for evaluation of patient's intra extracranial vessels.  Upon exam patient is alert and oriented x4, wife presents during the encounter.  Patient is not in acute distress, follow commands 2 steps properly.  Patient tells me that he was seen by his ophthalmology doctor today for ocular migraine however he was advised to go to the emergency room for images and further evaluation for possible stroke.  Patient tells me that he gets ocular migraine occasionally and on Monday he had a similar episodes of seeing gray blob with tail in the left visual field in both eyes but no actual visual deficit.  Patient denies any double or blurry vision.  Patient denies any headache, dizziness, lightheadedness.  Patient denies any numbness, tingling, sensation deficit.  Patient denies any focal neurological symptoms, no gait problem.  Tongue protruded midline no aphasia dysarthria.  No ataxia or dysmetria.  No focal weakness per my exam strengths 5/5 in all 4 extremities.    Patient lives with his wife, does work as " .  Patient denies any tobacco, marijuana or illicit drug use.  Patient does drink 2 glasses of wine daily about 14 alcoholic beverages per week and occasional beer.  Patient is independent with all ADL and compliant with all his meds    Last Known Normal Date/Time: Monday, March 24, 2025 EST     Review of Systems   Eyes:  Positive for visual disturbance.   All other systems reviewed and are negative.     Past Medical History:   Diagnosis Date    Arthritis     Colon polyps     Hypertension     Kidney stone     Kidney stones     Lithotripsy in past    Stroke     Stroke (cerebrum)      Past Surgical History:   Procedure Laterality Date    APPENDECTOMY      EXTRACORPOREAL SHOCK WAVE LITHOTRIPSY (ESWL)       Family History   Problem Relation Age of Onset    Stroke Mother     Cancer Father     Heart disease Paternal Grandfather     Heart disease Maternal Grandfather      Social History     Socioeconomic History    Marital status:    Tobacco Use    Smoking status: Never    Smokeless tobacco: Never   Vaping Use    Vaping status: Never Used   Substance and Sexual Activity    Alcohol use: Yes     Alcohol/week: 5.0 standard drinks of alcohol     Types: 5 Cans of beer per week    Drug use: No     No Known Allergies  Prior to Admission medications    Medication Sig Start Date End Date Taking? Authorizing Provider   aspirin 81 MG chewable tablet Chew 1 tablet Daily.    Jaren Haynes MD   doxazosin (CARDURA) 4 MG tablet  6/23/16   Jaren Haynes MD   felodipine (PLENDIL) 10 MG 24 hr tablet  6/23/16   Jaren Haynes MD   hydrochlorothiazide (MICROZIDE) 12.5 MG capsule Take 1 capsule by mouth Daily.    Jaren Haynes MD   losartan (COZAAR) 50 MG tablet  8/27/18   Jaren Haynes MD   sildenafil (VIAGRA) 100 MG tablet  11/13/20   Jaren Haynes MD         Temp:  [98.9 °F (37.2 °C)] 98.9 °F (37.2 °C)  Heart Rate:  [60-72] 65  Resp:  [18] 18  BP: (130-143)/(79-91)  143/84  Neurological Exam  Mental Status  Alert. Oriented to person, place and time. Oriented to person, place, and time. Speech is normal. Language is fluent with no aphasia. Attention and concentration are normal.    Cranial Nerves  CN II: Right visual acuity: Normal. Left visual acuity: Normal. Right normal visual field. Left normal visual field.  CN III, IV, VI: Extraocular movements intact bilaterally. Normal lids and orbits bilaterally. Pupils equal round and reactive to light bilaterally.  CN V: Facial sensation is normal.  CN VII: Full and symmetric facial movement.  CN VIII: Intact bilateral.  CN IX, X: Palate elevates symmetrically  CN XI: Shoulder shrug strength is normal.  CN XII: Tongue midline without atrophy or fasciculations.    Motor  Normal muscle bulk throughout. No fasciculations present. Normal muscle tone. No abnormal involuntary movements. Strength is 5/5 throughout all four extremities.    Sensory  Light touch is normal in upper and lower extremities.  No right-sided hemispatial neglect. No left-sided hemispatial neglect. Right extinction absent: Left extinction absent:    Reflexes  Right Plantar: downgoing  Left Plantar: downgoing    Coordination  Right: Finger-to-nose normal. Heel-to-shin normal.Left: Finger-to-nose normal. Heel-to-shin normal.    Gait  Casual gait is normal including stance, stride, and arm swing.      Physical Exam  Constitutional:       Appearance: He is obese.   HENT:      Head: Normocephalic and atraumatic.      Mouth/Throat:      Mouth: Mucous membranes are moist.   Eyes:      General: Lids are normal.      Extraocular Movements: Extraocular movements intact.      Pupils: Pupils are equal, round, and reactive to light.   Cardiovascular:      Rate and Rhythm: Normal rate and regular rhythm.      Pulses: Normal pulses.      Heart sounds: Normal heart sounds.   Abdominal:      Tenderness: There is no abdominal tenderness.   Musculoskeletal:      Cervical back: Normal  range of motion and neck supple.   Skin:     General: Skin is warm and dry.      Capillary Refill: Capillary refill takes less than 2 seconds.   Neurological:      General: No focal deficit present.      Mental Status: He is alert and oriented to person, place, and time.      Motor: Motor strength is normal.  Psychiatric:         Mood and Affect: Mood normal.         Speech: Speech normal.         Behavior: Behavior normal.         Thought Content: Thought content normal.         Judgment: Judgment normal.         Acute Stroke Data    Thrombolytic Inclusion / Exclusion Criteria    Time: 19:55 EDT  Person Administering Scale: JAMI Galicia      YES NO INCLUSION CRITERIA CLASS I   [] [x]   Suspected diagnosis of acute ischemic stroke with measureable neurological deficit.  Low NIHSS with disabling stroke symptoms.   [] []   Onset of stroke symptoms < 3 hours before beginning treatment >/ 18 years old  Stroke symptom onset = time patient was last seen well or without symptoms (LKW)   [] []   Onset of symptoms between 3-4.5 hours: >/= 80 years old (safe Class IIa) with history of   both diabetes and prior CVA  (reasonable Class IIb) AND NIHSS </= 25  *If not eligible for IV Thrombolytic consider neuro intervention for LKW within 24 hours     YES NO EXCLUSION CRITERIA (CONTRAINDICATIONS) CLASS III EVIDENCE HARM   [] []   Blood pressure >185/110 medically refractory to IV medications   [] []   Active bleeding at a non-compressible site   [] []   Active intracranial hemorrhage (ICH)   [] []   Symptoms suggestive of subarachnoid hemorrhage (SAH)   [] []   GI bleed within 21 days   [] []   Ischemic stroke within 3 months   [] []   Severe head trauma within 3 months    [] []   Intracranial or intraspinal surgery within 3 months   [] []   Current GI malignancy   [] []   Intracranial neoplasm   [] []   Infective endocarditis   [] []   Aortic arch dissection   [] []   Active coagulopathy with  INR >1.7, platelets  <100,000, PTT > 40 sec, PT > 15 sec   *For warfarin, administration can begin before blood tests resulted. Discontinue for above values.    [] []   Treatment dose* of LMWH (Lovenox) in last 24 hours  *prophylactic dosages are not a contraindication   [] []   Concurrent use of antiplatelet agents' glycoprotein inhibitors IIb/IIIa (Integrilin, etc.)   [] []   Thrombin or factor Xa inhibitors (Eliquis, Xarelto, Arixtra) taken in last 48 hours     YES NO CLASS II: AIS WITH THE FOLLOWING CONDITIONS -   TREATMENT RISKS SHOULD BE WEIGHED AGAINST POSSIBLE BENEFITS.    [] []   Major trauma in last 14 days, recent major surgery in last 14 days, intracranial arterial dissection, giant unruptured and unsecured intracranial aneurysm, pericarditis     [] []   The risks and benefits have been discussed with the patient or family related to the administration of IV thrombolytic therapy for stroke symptoms.   [] []   I have discussed and reviewed the patient's case and imaging with the attending prior to IV thrombolytic therapy.   TIME na Time IV thrombolytic administered       Hospital Meds:  Scheduled- aspirin, 81 mg, Oral, Daily   Or  aspirin, 300 mg, Rectal, Daily  clopidogrel, 75 mg, Oral, Daily  Testosterone, 11 each, Implant, Once      Infusions-     PRNs-     Functional Status Prior to Current Stroke/Atwood Score: 0    NIH Stroke Scale  Time: 19:55 EDT  Person Administering Scale: JAMI Galicia    Interval: 24 hrs post onset of symptoms +/- 20 mins  1a. Level of Consciousness: 0-->Alert, keenly responsive  1b. LOC Questions: 0-->Answers both questions correctly  1c. LOC Commands: 0-->Performs both tasks correctly  2. Best Gaze: 0-->Normal  3. Visual: 0-->No visual loss  4. Facial Palsy: 0-->Normal symmetrical movements  5a. Motor Arm, Left: 0-->No drift, limb holds 90 (or 45) degrees for full 10 secs  5b. Motor Arm, Right: 0-->No drift, limb holds 90 (or 45) degrees for full 10 secs  6a. Motor Leg, Left: 0-->No  drift, leg holds 30 degree position for full 5 secs  6b. Motor Leg, Right: 0-->No drift, leg holds 30 degree position for full 5 secs  7. Limb Ataxia: 0-->Absent  8. Sensory: 0-->Normal, no sensory loss  9. Best Language: 0-->No aphasia, normal  10. Dysarthria: 0-->Normal  11. Extinction and Inattention (formerly Neglect): 0-->No abnormality    Total (NIH Stroke Scale): 0       Results Reviewed:  I have personally reviewed current lab, radiology, and data and agree with results.     Sodium 139  Potassium 3.6  BUN 15  Creat. 1.20  GFR 65.9  Glucose 86  AST20  ALT 22  ALP80  WBC 5.90  H/H 14.6/42.5  Plt 236  MRI Brain Without Contrast  Result Date: 3/26/2025  Impression: 1.Acute to subacute right occipital infarction. Electronically Signed: Apolinar Sutton MD  3/26/2025 6:04 PM EDT  Workstation ID: [id number]        Assessment/Plan:    This is a 69-year-old white male, right-handed with multiple vascular risk factor presents to [facility] ED as a referral from his ophthalmologist for evaluation of ocular migraine and to rule out stroke.  MRI as a part of ED workup revealed that patient has right occipital right temporal acute infarct.  Neurology stroke is consulted from the ED for evaluation of patient's symptoms and management.  Patient is not a candidate for IV thrombolytic therapy secondary to last normal well outside the window symptoms going on for 48 hours.  Patient seems to be not a candidate for mechanical thrombectomy for the same reason completed infarct.    Antiplatelet PTA: Aspirin 325  Anticoagulant PTA: None             Opaque objects in the visual field bilateral.  Acute right temporal/occipital infarct  Etiology: Unclear yet tell evaluation of patient's vessels    -TIA/CVA order set without thrombolytic therapy has been initiated  -NPO until bedside nursing dysphagia screen completed  -MRI brain ordered and revealed a right occipital, right temporal acute infarct  -CTA head and neck ordered and pending to  evaluate patient's vessels and revealed Icad mixed plaque Bi ICA more GERARD  -TTE ordered and pending  -A1c and lipid panel in AM  -Meds: DAPT, patient is taking full dose aspirin home meds will load with 300 mg Plavix then follow with plavix 75 mg p.o. and daily for now till evaluation of vessels, if iCAD is the etiology patient will remain on DAPT ( ASA 81 mg, plavix 75 mg) for 21 days then will switch to Plavix 75 mg indefinite as patient used to take aspirin prior to the stroke.  -NIH\neurocheck per protocol  -Activity as tolerated, fall risk precautions  -PT/OT/SLP evaluation  -Neurology stroke will continue to follow-up    2.  Essential hypertension,   -Normotensive blood pressure is at goal managed by medicine team    3.  Hyperlipidemia  -Lipid panel in AM  -Atorvastatin 80mg nightly p.o. daily for secondary stroke prevention    4.  Alcohol use  -Counseling on cessation.    5.  Obesity  -BMI 34.70  -Complicated aspects of care  -Counseling on healthy diet exercise and weight loss    Plan of care was discussed with patient, wife ED physician.  Stroke neurology will continue to follow.  Please call with any questions or concerns.  Thank you for this consult.          JAMI Galicia  March 26, 2025  19:55 EDT

## 2025-03-26 NOTE — CASE MANAGEMENT/SOCIAL WORK
Discharge Planning Assessment  [facility]     Patient Name: Jonnie Hollis  MRN: [id number]  Today's Date: 3/26/2025    Admit Date: 3/26/2025    Plan: Home   Discharge Needs Assessment       Row Name 03/26/25 1824       Living Environment    People in Home spouse    Name(s) of People in Home Nenita Hollis Spouse [phone]    Current Living Arrangements home    Potentially Unsafe Housing Conditions none    In the past 12 months has the electric, gas, oil, or water company threatened to shut off services in your home? No    Primary Care Provided by self    Provides Primary Care For no one    Family Caregiver if Needed spouse    Quality of Family Relationships helpful;involved;supportive    Able to Return to Prior Arrangements yes       Resource/Environmental Concerns    Resource/Environmental Concerns none    Transportation Concerns none       Transportation Needs    In the past 12 months, has lack of transportation kept you from medical appointments or from getting medications? no    In the past 12 months, has lack of transportation kept you from meetings, work, or from getting things needed for daily living? No       Food Insecurity    Within the past 12 months, you worried that your food would run out before you got the money to buy more. Never true    Within the past 12 months, the food you bought just didn't last and you didn't have money to get more. Never true       Transition Planning    Patient/Family Anticipates Transition to home    Patient/Family Anticipated Services at Transition none    Transportation Anticipated family or friend will provide       Discharge Needs Assessment    Readmission Within the Last 30 Days no previous admission in last 30 days    Equipment Currently Used at Home cpap    Concerns to be Addressed denies needs/concerns at this time    Do you want help finding or keeping work or a job? I do not need or want help    Do you want help with school or training? For  example, starting or completing job training or getting a high school diploma, GED or equivalent No    Anticipated Changes Related to Illness none    Equipment Needed After Discharge none                   Discharge Plan       Row Name 03/26/25 1824       Plan    Plan Home    Patient/Family in Agreement with Plan yes    Plan Comments CM spoke with patient and spouse at bedside regarding DC planning. Patient resides in [facility] with his spouse. Patient is independent with ADL's, denies any DME except for a CPAP qhs. Patient denies any current home health or outpatient services. Patient has medical insurance, prescription coverage and is able to afford/obtain medications without difficulty. Patient has no advanced directives. Patient denies any discharge planning needs at this time. Goal is home. CM will continue to follow    Final Discharge Disposition Code 01 - home or self-care                       Demographic Summary       Row Name 03/26/25 1822       General Information    Arrived From home    Referral Source emergency department    Reason for Consult discharge planning    Preferred Language English       Contact Information    Contact Information Comments Nenita Hollis Spouse [phone]                   Functional Status       Row Name 03/26/25 1823       Functional Status    Usual Activity Tolerance good    Current Activity Tolerance good       Physical Activity    On average, how many days per week do you engage in moderate to strenuous exercise (like a brisk walk)? 5 days    On average, how many minutes do you engage in exercise at this level? 30 min    Number of minutes of exercise per week 150       Assessment of Health Literacy    How often do you have someone help you read hospital materials? Never    How often do you have problems learning about your medical condition because of difficulty understanding written information? Never    How often do you have a problem understanding what is told  to you about your medical condition? Never    How confident are you filling out medical forms by yourself? Quite a bit    Health Literacy Good       Functional Status, IADL    Medications independent    Meal Preparation independent    Housekeeping independent    Laundry independent    Shopping independent    If for any reason you need help with day-to-day activities such as bathing, preparing meals, shopping, managing finances, etc., do you get the help you need? I get all the help I need       Mental Status    General Appearance WDL WDL       Mental Status Summary    Recent Changes in Mental Status/Cognitive Functioning no changes       Employment/    Employment Status employed full-time                   Psychosocial    No documentation.                  Abuse/Neglect    No documentation.                  Legal    No documentation.                  Substance Abuse    No documentation.                  Patient Forms    No documentation.                     More Colby RN

## 2025-03-26 NOTE — ED NOTES
Jonnie Hollis    Nursing Report ED to Floor:  Mental status: A&ox4  Ambulatory status: independent   Oxygen Therapy:  RA  Cardiac Rhythm: NSR  Admitted from: home  Safety Concerns:  fall risk  Precautions: none  Social Issues: none  ED Room #:  11    ED Nurse Phone Extension [phone] or may call [phone].      HPI:   Chief Complaint   Patient presents with    Loss of Vision       Past Medical History:  Past Medical History:   Diagnosis Date    Arthritis     Colon polyps     Hypertension     Kidney stone     Kidney stones     Lithotripsy in past    Stroke     Stroke (cerebrum)         Past Surgical History:  Past Surgical History:   Procedure Laterality Date    APPENDECTOMY      EXTRACORPOREAL SHOCK WAVE LITHOTRIPSY (ESWL)          Admitting Doctor:   Ginger Zee MD    Consulting Provider(s):  Consults       No orders found from 2/25/2025 to 3/27/2025.             Admitting Diagnosis:   There were no encounter diagnoses.    Most Recent Vitals:   Vitals:    03/26/25 1814 03/26/25 1819 03/26/25 1824 03/26/25 1829   BP: 138/90   143/85   BP Location:       Patient Position:       Pulse: 62 61 64 64   Resp:       Temp:       TempSrc:       SpO2: 98% 98% 98% 97%   Weight:       Height:           Active LDAs/IV Access:   Lines, Drains & Airways       Active LDAs       Name Placement date Placement time Site Days    Peripheral IV 03/26/25 1619 Posterior;Right Hand 03/26/25  1619  Hand  less than 1                    Labs (abnormal labs have a star):   Labs Reviewed   CBC WITH AUTO DIFFERENTIAL - Abnormal; Notable for the following components:       Result Value    Lymphocyte % 16.3 (*)     Monocyte % 15.1 (*)     All other components within normal limits   MAGNESIUM - Normal   COMPREHENSIVE METABOLIC PANEL    Narrative:     GFR Categories in Chronic Kidney Disease (CKD)      GFR Category          GFR (mL/min/1.73)    Interpretation  G1                     90 or greater         Normal or high (1)  G2                       60-89                Mild decrease (1)  G3a                   45-59                Mild to moderate decrease  G3b                   30-44                Moderate to severe decrease  G4                    15-29                Severe decrease  G5                    14 or less           Kidney failure          (1)In the absence of evidence of kidney disease, neither GFR category G1 or G2 fulfill the criteria for CKD.    eGFR calculation 2021 CKD-EPI creatinine equation, which does not include race as a factor   CBC AND DIFFERENTIAL    Narrative:     The following orders were created for panel order CBC & Differential.  Procedure                               Abnormality         Status                     ---------                               -----------         ------                     CBC Auto Differential[[id number]]        Abnormal            Final result                 Please view results for these tests on the individual orders.       Meds Given in ED:   Medications - No data to display        Last NIH score:                                                          Dysphagia screening results:  Patient Factors Component (Dysphagia:Stroke or Rule-out)  Best Eye Response: 4-->(E4) spontaneous (03/26/25 1618)  Best Motor Response: 6-->(M6) obeys commands (03/26/25 1618)  Best Verbal Response: 5-->(V5) oriented (03/26/25 1618)  Haines Coma Scale Score: 15 (03/26/25 1618)     Tereza Coma Scale:  No data recorded     CIWA:        Restraint Type:            Isolation Status:  No active isolations

## 2025-03-27 ENCOUNTER — APPOINTMENT (OUTPATIENT)
Dept: CARDIOLOGY | Facility: HOSPITAL | Age: 70
End: 2025-03-27
Payer: COMMERCIAL

## 2025-03-27 ENCOUNTER — READMISSION MANAGEMENT (OUTPATIENT)
Dept: CALL CENTER | Facility: HOSPITAL | Age: 70
End: 2025-03-27
Payer: COMMERCIAL

## 2025-03-27 VITALS
DIASTOLIC BLOOD PRESSURE: 94 MMHG | SYSTOLIC BLOOD PRESSURE: 140 MMHG | HEART RATE: 59 BPM | RESPIRATION RATE: 18 BRPM | HEIGHT: 66 IN | WEIGHT: 204 LBS | TEMPERATURE: 98 F | OXYGEN SATURATION: 97 % | BODY MASS INDEX: 32.78 KG/M2

## 2025-03-27 LAB
ANION GAP SERPL CALCULATED.3IONS-SCNC: 12 MMOL/L (ref 5–15)
AORTIC DIMENSIONLESS INDEX: 0.56 (DI)
ASCENDING AORTA: 3.6 CM
AV MEAN PRESS GRAD SYS DOP V1V2: 5.3 MMHG
AV VMAX SYS DOP: 154.3 CM/SEC
BH CV ECHO MEAS - AO MAX PG: 9.5 MMHG
BH CV ECHO MEAS - AO ROOT DIAM: 3.9 CM
BH CV ECHO MEAS - AO V2 VTI: 33.7 CM
BH CV ECHO MEAS - AVA(I,D): 2.14 CM2
BH CV ECHO MEAS - EDV(CUBED): 125 ML
BH CV ECHO MEAS - EDV(MOD-SP2): 133 ML
BH CV ECHO MEAS - EDV(MOD-SP4): 160 ML
BH CV ECHO MEAS - EF(MOD-SP2): 59.2 %
BH CV ECHO MEAS - EF(MOD-SP4): 68.6 %
BH CV ECHO MEAS - ESV(CUBED): 19.7 ML
BH CV ECHO MEAS - ESV(MOD-SP2): 54.3 ML
BH CV ECHO MEAS - ESV(MOD-SP4): 50.3 ML
BH CV ECHO MEAS - FS: 46 %
BH CV ECHO MEAS - IVS/LVPW: 1.13 CM
BH CV ECHO MEAS - IVSD: 0.9 CM
BH CV ECHO MEAS - LA DIMENSION: 4.4 CM
BH CV ECHO MEAS - LAT PEAK E' VEL: 8.7 CM/SEC
BH CV ECHO MEAS - LV DIASTOLIC VOL/BSA (35-75): 79.3 CM2
BH CV ECHO MEAS - LV MASS(C)D: 146.8 GRAMS
BH CV ECHO MEAS - LV MAX PG: 2.8 MMHG
BH CV ECHO MEAS - LV MEAN PG: 1.5 MMHG
BH CV ECHO MEAS - LV SYSTOLIC VOL/BSA (12-30): 24.9 CM2
BH CV ECHO MEAS - LV V1 MAX: 84.2 CM/SEC
BH CV ECHO MEAS - LV V1 VTI: 19 CM
BH CV ECHO MEAS - LVIDD: 5 CM
BH CV ECHO MEAS - LVIDS: 2.7 CM
BH CV ECHO MEAS - LVOT AREA: 3.8 CM2
BH CV ECHO MEAS - LVOT DIAM: 2.2 CM
BH CV ECHO MEAS - LVPWD: 0.8 CM
BH CV ECHO MEAS - MED PEAK E' VEL: 6.7 CM/SEC
BH CV ECHO MEAS - MV A MAX VEL: 59.1 CM/SEC
BH CV ECHO MEAS - MV DEC SLOPE: 237 CM/SEC2
BH CV ECHO MEAS - MV DEC TIME: 0.25 SEC
BH CV ECHO MEAS - MV E MAX VEL: 62.8 CM/SEC
BH CV ECHO MEAS - MV E/A: 1.06
BH CV ECHO MEAS - MV P1/2T: 93.9 MSEC
BH CV ECHO MEAS - MVA(P1/2T): 2.34 CM2
BH CV ECHO MEAS - PA V2 MAX: 121 CM/SEC
BH CV ECHO MEAS - PAPD(PI EDV): 3 MMHG
BH CV ECHO MEAS - PI END-D VEL: 96 CM/SEC
BH CV ECHO MEAS - RAP SYSTOLE: 3 MMHG
BH CV ECHO MEAS - RVSP: 23 MMHG
BH CV ECHO MEAS - SV(LVOT): 72 ML
BH CV ECHO MEAS - SV(MOD-SP2): 78.7 ML
BH CV ECHO MEAS - SV(MOD-SP4): 109.7 ML
BH CV ECHO MEAS - SVI(LVOT): 35.7 ML/M2
BH CV ECHO MEAS - SVI(MOD-SP2): 39 ML/M2
BH CV ECHO MEAS - SVI(MOD-SP4): 54.4 ML/M2
BH CV ECHO MEAS - TAPSE (>1.6): 2.23 CM
BH CV ECHO MEAS - TR MAX PG: 20.2 MMHG
BH CV ECHO MEAS - TR MAX VEL: 224.7 CM/SEC
BH CV ECHO MEASUREMENTS AVERAGE E/E' RATIO: 8.16
BH CV ECHO SHUNT ASSESSMENT PERFORMED (HIDDEN SCRIPTING): 1
BH CV LOWER VASCULAR LEFT COMMON FEMORAL AUGMENT: NORMAL
BH CV LOWER VASCULAR LEFT COMMON FEMORAL COMPETENT: NORMAL
BH CV LOWER VASCULAR LEFT COMMON FEMORAL COMPRESS: NORMAL
BH CV LOWER VASCULAR LEFT COMMON FEMORAL PHASIC: NORMAL
BH CV LOWER VASCULAR LEFT COMMON FEMORAL SPONT: NORMAL
BH CV LOWER VASCULAR LEFT DISTAL FEMORAL COMPRESS: NORMAL
BH CV LOWER VASCULAR LEFT GASTRONEMIUS COMPRESS: NORMAL
BH CV LOWER VASCULAR LEFT GREATER SAPH AK COMPRESS: NORMAL
BH CV LOWER VASCULAR LEFT GREATER SAPH BK COMPRESS: NORMAL
BH CV LOWER VASCULAR LEFT LESSER SAPH COMPRESS: NORMAL
BH CV LOWER VASCULAR LEFT MID FEMORAL AUGMENT: NORMAL
BH CV LOWER VASCULAR LEFT MID FEMORAL COMPETENT: NORMAL
BH CV LOWER VASCULAR LEFT MID FEMORAL COMPRESS: NORMAL
BH CV LOWER VASCULAR LEFT MID FEMORAL PHASIC: NORMAL
BH CV LOWER VASCULAR LEFT MID FEMORAL SPONT: NORMAL
BH CV LOWER VASCULAR LEFT PERONEAL COMPRESS: NORMAL
BH CV LOWER VASCULAR LEFT POPLITEAL AUGMENT: NORMAL
BH CV LOWER VASCULAR LEFT POPLITEAL COMPETENT: NORMAL
BH CV LOWER VASCULAR LEFT POPLITEAL COMPRESS: NORMAL
BH CV LOWER VASCULAR LEFT POPLITEAL PHASIC: NORMAL
BH CV LOWER VASCULAR LEFT POPLITEAL SPONT: NORMAL
BH CV LOWER VASCULAR LEFT POSTERIOR TIBIAL COMPRESS: NORMAL
BH CV LOWER VASCULAR LEFT PROFUNDA FEMORAL COMPRESS: NORMAL
BH CV LOWER VASCULAR LEFT PROXIMAL FEMORAL COMPRESS: NORMAL
BH CV LOWER VASCULAR LEFT SAPHENOFEMORAL JUNCTION COMPRESS: NORMAL
BH CV LOWER VASCULAR RIGHT COMMON FEMORAL AUGMENT: NORMAL
BH CV LOWER VASCULAR RIGHT COMMON FEMORAL COMPETENT: NORMAL
BH CV LOWER VASCULAR RIGHT COMMON FEMORAL COMPRESS: NORMAL
BH CV LOWER VASCULAR RIGHT COMMON FEMORAL PHASIC: NORMAL
BH CV LOWER VASCULAR RIGHT COMMON FEMORAL SPONT: NORMAL
BH CV LOWER VASCULAR RIGHT DISTAL FEMORAL COMPRESS: NORMAL
BH CV LOWER VASCULAR RIGHT GASTRONEMIUS COMPRESS: NORMAL
BH CV LOWER VASCULAR RIGHT GREATER SAPH AK COMPRESS: NORMAL
BH CV LOWER VASCULAR RIGHT GREATER SAPH BK COMPRESS: NORMAL
BH CV LOWER VASCULAR RIGHT LESSER SAPH COMPRESS: NORMAL
BH CV LOWER VASCULAR RIGHT MID FEMORAL AUGMENT: NORMAL
BH CV LOWER VASCULAR RIGHT MID FEMORAL COMPETENT: NORMAL
BH CV LOWER VASCULAR RIGHT MID FEMORAL COMPRESS: NORMAL
BH CV LOWER VASCULAR RIGHT MID FEMORAL PHASIC: NORMAL
BH CV LOWER VASCULAR RIGHT MID FEMORAL SPONT: NORMAL
BH CV LOWER VASCULAR RIGHT PERONEAL COMPRESS: NORMAL
BH CV LOWER VASCULAR RIGHT POPLITEAL AUGMENT: NORMAL
BH CV LOWER VASCULAR RIGHT POPLITEAL COMPETENT: NORMAL
BH CV LOWER VASCULAR RIGHT POPLITEAL COMPRESS: NORMAL
BH CV LOWER VASCULAR RIGHT POPLITEAL PHASIC: NORMAL
BH CV LOWER VASCULAR RIGHT POPLITEAL SPONT: NORMAL
BH CV LOWER VASCULAR RIGHT POSTERIOR TIBIAL COMPRESS: NORMAL
BH CV LOWER VASCULAR RIGHT PROFUNDA FEMORAL COMPRESS: NORMAL
BH CV LOWER VASCULAR RIGHT PROXIMAL FEMORAL COMPRESS: NORMAL
BH CV LOWER VASCULAR RIGHT SAPHENOFEMORAL JUNCTION COMPRESS: NORMAL
BH CV VAS BP LEFT ARM: NORMAL MMHG
BH CV XLRA - RV BASE: 5 CM
BH CV XLRA - RV LENGTH: 7.7 CM
BH CV XLRA - RV MID: 4.3 CM
BH CV XLRA - TDI S': 12.7 CM/SEC
BUN SERPL-MCNC: 15 MG/DL (ref 8–23)
BUN/CREAT SERPL: 13.9 (ref 7–25)
CALCIUM SPEC-SCNC: 8.9 MG/DL (ref 8.6–10.5)
CHLORIDE SERPL-SCNC: 106 MMOL/L (ref 98–107)
CHOLEST SERPL-MCNC: 157 MG/DL (ref 0–200)
CO2 SERPL-SCNC: 24 MMOL/L (ref 22–29)
CREAT SERPL-MCNC: 1.08 MG/DL (ref 0.76–1.27)
DEPRECATED RDW RBC AUTO: 41.4 FL (ref 37–54)
EGFRCR SERPLBLD CKD-EPI 2021: 74.7 ML/MIN/1.73
ERYTHROCYTE [DISTWIDTH] IN BLOOD BY AUTOMATED COUNT: 13.2 % (ref 12.3–15.4)
GLUCOSE BLDC GLUCOMTR-MCNC: 88 MG/DL (ref 70–130)
GLUCOSE SERPL-MCNC: 92 MG/DL (ref 65–99)
HBA1C MFR BLD: 5.2 % (ref 4.8–5.6)
HCT VFR BLD AUTO: 42.3 % (ref 37.5–51)
HDLC SERPL-MCNC: 40 MG/DL (ref 40–60)
HGB BLD-MCNC: 14.2 G/DL (ref 13–17.7)
IVRT: 88 MS
LDLC SERPL CALC-MCNC: 95 MG/DL (ref 0–100)
LDLC/HDLC SERPL: 2.33 {RATIO}
LEFT ATRIUM VOLUME INDEX: 34.7 ML/M2
LV EF 2D ECHO EST: 63 %
LV EF BIPLANE MOD: 62.6 %
MCH RBC QN AUTO: 28.7 PG (ref 26.6–33)
MCHC RBC AUTO-ENTMCNC: 33.6 G/DL (ref 31.5–35.7)
MCV RBC AUTO: 85.6 FL (ref 79–97)
PLATELET # BLD AUTO: 232 10*3/MM3 (ref 140–450)
PMV BLD AUTO: 10.1 FL (ref 6–12)
POTASSIUM SERPL-SCNC: 3.8 MMOL/L (ref 3.5–5.2)
QT INTERVAL: 404 MS
QTC INTERVAL: 406 MS
RBC # BLD AUTO: 4.94 10*6/MM3 (ref 4.14–5.8)
SODIUM SERPL-SCNC: 142 MMOL/L (ref 136–145)
TRIGL SERPL-MCNC: 120 MG/DL (ref 0–150)
VLDLC SERPL-MCNC: 22 MG/DL (ref 5–40)
WBC NRBC COR # BLD AUTO: 4.93 10*3/MM3 (ref 3.4–10.8)

## 2025-03-27 PROCEDURE — 80061 LIPID PANEL: CPT

## 2025-03-27 PROCEDURE — 93010 ELECTROCARDIOGRAM REPORT: CPT | Performed by: INTERNAL MEDICINE

## 2025-03-27 PROCEDURE — 93970 EXTREMITY STUDY: CPT

## 2025-03-27 PROCEDURE — 99239 HOSP IP/OBS DSCHRG MGMT >30: CPT | Performed by: HOSPITALIST

## 2025-03-27 PROCEDURE — 97161 PT EVAL LOW COMPLEX 20 MIN: CPT

## 2025-03-27 PROCEDURE — G0378 HOSPITAL OBSERVATION PER HR: HCPCS

## 2025-03-27 PROCEDURE — 85027 COMPLETE CBC AUTOMATED: CPT | Performed by: FAMILY MEDICINE

## 2025-03-27 PROCEDURE — 83036 HEMOGLOBIN GLYCOSYLATED A1C: CPT

## 2025-03-27 PROCEDURE — 82948 REAGENT STRIP/BLOOD GLUCOSE: CPT

## 2025-03-27 PROCEDURE — 80048 BASIC METABOLIC PNL TOTAL CA: CPT | Performed by: FAMILY MEDICINE

## 2025-03-27 PROCEDURE — 93005 ELECTROCARDIOGRAM TRACING: CPT | Performed by: HOSPITALIST

## 2025-03-27 PROCEDURE — 93970 EXTREMITY STUDY: CPT | Performed by: INTERNAL MEDICINE

## 2025-03-27 PROCEDURE — 93306 TTE W/DOPPLER COMPLETE: CPT

## 2025-03-27 PROCEDURE — 99214 OFFICE O/P EST MOD 30 MIN: CPT | Performed by: STUDENT IN AN ORGANIZED HEALTH CARE EDUCATION/TRAINING PROGRAM

## 2025-03-27 PROCEDURE — 92523 SPEECH SOUND LANG COMPREHEN: CPT

## 2025-03-27 PROCEDURE — 93306 TTE W/DOPPLER COMPLETE: CPT | Performed by: INTERNAL MEDICINE

## 2025-03-27 PROCEDURE — 97165 OT EVAL LOW COMPLEX 30 MIN: CPT

## 2025-03-27 RX ORDER — CLOPIDOGREL BISULFATE 75 MG/1
75 TABLET ORAL DAILY
Status: DISCONTINUED | OUTPATIENT
Start: 2025-03-28 | End: 2025-03-27 | Stop reason: HOSPADM

## 2025-03-27 RX ORDER — AMLODIPINE BESYLATE 10 MG/1
10 TABLET ORAL
Status: DISCONTINUED | OUTPATIENT
Start: 2025-03-27 | End: 2025-03-27 | Stop reason: HOSPADM

## 2025-03-27 RX ORDER — ATORVASTATIN CALCIUM 80 MG/1
80 TABLET, FILM COATED ORAL NIGHTLY
Qty: 90 TABLET | Refills: 0 | Status: SHIPPED | OUTPATIENT
Start: 2025-03-27

## 2025-03-27 RX ORDER — CLOPIDOGREL BISULFATE 75 MG/1
300 TABLET ORAL ONCE
Status: COMPLETED | OUTPATIENT
Start: 2025-03-27 | End: 2025-03-27

## 2025-03-27 RX ORDER — CLOPIDOGREL BISULFATE 75 MG/1
75 TABLET ORAL DAILY
Qty: 21 TABLET | Refills: 0 | Status: SHIPPED | OUTPATIENT
Start: 2025-03-28 | End: 2025-04-18

## 2025-03-27 RX ORDER — FOLIC ACID 1 MG/1
1 TABLET ORAL DAILY
Qty: 30 TABLET | Refills: 0 | Status: SHIPPED | OUTPATIENT
Start: 2025-03-28 | End: 2025-04-27

## 2025-03-27 RX ADMIN — LOSARTAN POTASSIUM 50 MG: 50 TABLET, FILM COATED ORAL at 09:58

## 2025-03-27 RX ADMIN — FOLIC ACID 1 MG: 1 TABLET ORAL at 08:22

## 2025-03-27 RX ADMIN — Medication 10 ML: at 08:25

## 2025-03-27 RX ADMIN — ASPIRIN 81 MG: 81 TABLET, CHEWABLE ORAL at 08:23

## 2025-03-27 RX ADMIN — CLOPIDOGREL BISULFATE 300 MG: 75 TABLET, FILM COATED ORAL at 03:10

## 2025-03-27 RX ADMIN — HYDROCHLOROTHIAZIDE 12.5 MG: 25 TABLET ORAL at 08:22

## 2025-03-27 RX ADMIN — AMLODIPINE BESYLATE 10 MG: 10 TABLET ORAL at 17:45

## 2025-03-27 NOTE — PROGRESS NOTES
Stroke Neurology Progress Note     Subjective     This patient was seen in follow-up for: acute right occipital infarct   Present for the encounter were: self, patient, patient's wife    Subjective:  My first encounter with the patient.  No acute events overnight.  Reviewed MRI with the patient and his wife at bedside.  On exam, his visual fields are intact but he reports a dark spot in his left visual field.  All questions and concerns were answered.    Objective      Temp:  [97.8 °F (36.6 °C)-98.9 °F (37.2 °C)] 98.1 °F (36.7 °C)  Heart Rate:  [51-80] 61  Resp:  [16-20] 20  BP: (130-143)/(79-91) 135/83            Objective    Physical Exam:  General Appearance: Alert  HEENT: anicteric sclera, no scleral injection  Lungs: respirations appear comfortable, no obvious increased work of breathing  Extremities: No cyanosis or fingernail clubbing   Skin: No rashes in exposed skin areas     Neurological Examination:   Mental status: Alert and oriented. No dysarthria. Able to name and repeat.  Cranial Nerves: Visual fields intact.  Patient reports a dark spot in his left visual field.  Extraocular movements intact with no nystagmus. Midline gaze. Face symmetric.    Sensory: Normal sensory exam to light touch.  Motor: Normal tone. Absent pronator drift.  Strength:  LUE: 5/5 biceps, triceps,   LLE: 5/5 hip flexion/extension  RUE: 5/5 biceps, triceps,   RLE:  5/5 hip flexion/extension  Cerebellar: Finger-to-nose intact. Heel-to-shin intact. Rapid alternating movements are intact.   Gait: Not assessed.     Labs:    Lab Results   Component Value Date    HGBA1C 5.20 03/27/2025      Lab Results   Component Value Date    CHOL 157 03/27/2025    CHLPL 181 12/23/2021    TRIG 120 03/27/2025    HDL 40 03/27/2025    LDL 95 03/27/2025       Lab Results   Component Value Date    WBC 4.93 03/27/2025    HGB 14.2 03/27/2025    HCT 42.3 03/27/2025    MCV 85.6 03/27/2025     03/27/2025     Lab Results   Component Value Date     GLUCOSE 92 03/27/2025    BUN 15 03/27/2025    CREATININE 1.08 03/27/2025    EGFRIFNONA 55 (L) 12/23/2021    BCR 13.9 03/27/2025    CO2 24.0 03/27/2025    CALCIUM 8.9 03/27/2025    ALBUMIN 4.0 03/26/2025    AST 20 03/26/2025    ALT 22 03/26/2025       Results from last 7 days   Lab Units 03/27/25  0413 03/26/25  1645   SODIUM mmol/L 142 139   POTASSIUM mmol/L 3.8 3.6   CHLORIDE mmol/L 106 102   CO2 mmol/L 24.0 25.0   BUN mg/dL 15 15   CREATININE mg/dL 1.08 1.20   CALCIUM mg/dL 8.9 9.1   BILIRUBIN mg/dL  --  0.4   ALK PHOS U/L  --  80   ALT (SGPT) U/L  --  22   AST (SGOT) U/L  --  20   GLUCOSE mg/dL 92 86       Results Review:      All brain images and reports were personally reviewed and I agree with the interpretations except as noted below.    CT Angiogram Head and Neck 3/27/2025  Impression: No evidence of hemodynamically significant stenosis, AVM or aneurysm within the head or neck by NASCET criteria.     MRI Brain Without Contrast 3/26/2025  Impression: 1. Acute to subacute right occipital infarction.           Assessment/Plan     Assessment:    Jonnie Hollis is a 69-year-old right-handed male with a past medical history of hypertension, hyperlipidemia, ocular migraine who presented to [facility] Emergency Department from his ophthalmology clinic for evaluation of left homonymous hemianopsia.  MRI brain reveals an acute right occipital infarct.  CT angiogram head and neck without significant stenoses or large vessel occlusion.    # Acute right occipital infarct  Suspect cardioembolic etiology.    -Patient passed bedside swallow  -Continue aspirin 81 mg daily  -Continue clopidogrel 75 mg daily for 21 days per [facility] protocol for low risk stroke  -Continue atorvastatin 80 mg daily LDL 95  -TTE final read pending  -Monitor telemetry for atrial fibrillation  -Holter monitor prior to discharge, ambulatory referral to heart and valve clinic placed  -If no atrial fibrillation is found on Holter  monitor, recommend loop recorder  -If atrial fibrillation is found, recommend discontinue dual antiplatelet therapy and add apixaban 5 mg twice daily  -PT OT evaluated the patient and recommend home  -Neuro-checks per unit protocol while inpatient  -Blood pressure goal: permissive SBP <180  -DVT prophylaxis: SCD, ambulates  -Follow-up in stroke clinic in 1 month after discharge      Patient education: call 911 or present to emergency department with any stroke symptom, including unilateral face, arm, or leg weakness, numbness, or paresthesias, unilateral facial droop, speech deficits, dizziness with nausea, vomiting, nystagmus, and incoordination, visual deficits, or severe onset headache.    Stroke neurology will follow results of above workup.  Patient may be able to discharge home today if TTE is unremarkable.  Thank you for this consult.  Please call with questions.     Rafaela Hagen MD  [facility]  03/27/25  12:10 EDT

## 2025-03-27 NOTE — PLAN OF CARE
Goal Outcome Evaluation:  Plan of Care Reviewed With: patient, spouse           Outcome Evaluation: Pt presents near baseline level of function and able to perform ADLs independently. Pt completed LB dressing ind. in unsupported standing and ambulated HH distance ind. with LOB. OT signing off, please reconsult if needed. Recommend home at d/c.    Anticipated Discharge Disposition (OT): home

## 2025-03-27 NOTE — THERAPY DISCHARGE NOTE
Patient Name: Jonnie Hollis  : 1955    MRN: [id number]                              Today's Date: 3/27/2025       Admit Date: 3/26/2025    Visit Dx:     ICD-10-CM ICD-9-CM   1. Acute ischemic stroke  I63.9 434.91     Patient Active Problem List   Diagnosis    Hypertension    CVA (cerebral vascular accident)    Borderline hyperlipidemia    Vitamin D deficiency    Polycythemia    Occipital infarction     Past Medical History:   Diagnosis Date    Arthritis     Colon polyps     Hypertension     Kidney stone     Kidney stones     Lithotripsy in past    Stroke     Stroke (cerebrum)      Past Surgical History:   Procedure Laterality Date    APPENDECTOMY      EXTRACORPOREAL SHOCK WAVE LITHOTRIPSY (ESWL)        General Information       Row Name 25 1411          Physical Therapy Time and Intention    Document Type discharge evaluation/summary  -CK     Mode of Treatment physical therapy;individual therapy  -CK       Row Name 25 1411          General Information    Patient Profile Reviewed yes  -CK     Prior Level of Function independent:;all household mobility;ADL's;home management;driving;work  ind no AD, works as an   -CK     Existing Precautions/Restrictions no known precautions/restrictions  -CK     Barriers to Rehab visual deficit  -CK       Row Name 25 1411          Living Environment    Current Living Arrangements home  -CK     People in Home spouse  -CK       Row Name 25 1411          Home Main Entrance    Number of Stairs, Main Entrance one  -CK       Row Name 25 1411          Stairs Within Home, Primary    Number of Stairs, Within Home, Primary none  -CK       Row Name 25 1411          Cognition    Orientation Status (Cognition) oriented x 4  -CK       Row Name 25 1411          Safety Issues/Impairments Affecting Functional Mobility    Impairments Affecting Function (Mobility) visual/perceptual  -CK     Comment, Safety Issues/Impairments (Mobility)  no safety issues identified, patient does report ongoing visual deficits with [place] of vision deficit on his L side  -CK               User Key  (r) = Recorded By, (t) = Taken By, (c) = Cosigned By      Initials Name Provider Type    CK Virgie Hernandez PT Physical Therapist                   Mobility       Row Name 03/27/25 1443          Bed Mobility    Bed Mobility supine-sit;sit-supine  -CK     Supine-Sit [place] (Bed Mobility) independent  -CK     Sit-Supine [place] (Bed Mobility) independent  -CK       Row Name 03/27/25 1443          Sit-Stand Transfer    Sit-Stand [place] (Transfers) independent  -CK       Row Name 03/27/25 1443          Gait/Stairs (Locomotion)    [place] Level (Gait) standby assist  -CK     Patient was able to Ambulate yes  -CK     Distance in Feet (Gait) 450  -CK     Deviations/Abnormal Patterns (Gait) base of support, wide  -CK     Bilateral Gait Deviations decreased arm swing  -CK     [place] Level (Stairs) stand by assist  -CK     Handrail Location (Stairs) right side (descending);right side (ascending)  -CK     Number of Steps (Stairs) 10  -CK     Ascending Technique (Stairs) step-over-step  -CK     Descending Technique (Stairs) step-over-step  -CK     Comment, (Gait/Stairs) Patient ambulated in qureshi with step through gait pattern, mildly wide ELIZABETH which appears to be his baseline. He was able to perform head turns L/R and up/down without LOB and denied dizziness. He navigated 10 steps without difficulty and reported his mobility feels to be at his baseline.  -CK               User Key  (r) = Recorded By, (t) = Taken By, (c) = Cosigned By      Initials Name Provider Type    Virgie Judd PT Physical Therapist                   Obj/Interventions       Row Name 03/27/25 1446          Range of Motion Comprehensive    General Range of Motion no range of motion deficits identified  -CK       Row Name 03/27/25 1446          Strength Comprehensive (MMT)     General Manual Muscle Testing (MMT) Assessment no strength deficits identified  -CK       Row Name 03/27/25 1446          Motor Skills    Motor Skills coordination  -CK     Coordination bilateral;lower extremity;WNL  -CK       Row Name 03/27/25 1446          Balance    Balance Assessment sitting static balance;standing static balance;standing dynamic balance  -CK     Static Sitting Balance dependent  -CK     Position, Sitting Balance unsupported;sitting edge of bed  -CK     Static Standing Balance independent  -CK     Dynamic Standing Balance standby assist  -CK     Position/Device Used, Standing Balance unsupported  -CK       Row Name 03/27/25 1446          Sensory Assessment (Somatosensory)    Sensory Assessment (Somatosensory) LE sensation intact  -CK               User Key  (r) = Recorded By, (t) = Taken By, (c) = Cosigned By      Initials Name Provider Type    CK Virgie Hernandez, PT Physical Therapist                   Goals/Plan    No documentation.                  Clinical Impression       Row Name 03/27/25 1447          Pain    Pretreatment Pain Rating 0/10 - no pain  -CK     Posttreatment Pain Rating 0/10 - no pain  -CK       Row Name 03/27/25 1447          Plan of Care Review    Plan of Care Reviewed With patient  -CK     Outcome Evaluation Patient reports ongoing visual deficits, but appears to be mobilizing at his functional baseline with ability to navigate unit SBA unsupported and navigate a flight of steps without difficulty. No IPPT needs identified. Will sign off and recommend D/C home when medically appropriate.  -CK       Row Name 03/27/25 1447          Therapy Assessment/Plan (PT)    Patient/Family Therapy Goals Statement (PT) go home  -CK     Criteria for Skilled Interventions Met (PT) no;no problems identified which require skilled intervention  -CK     Therapy Frequency (PT) evaluation only  -CK       Row Name 03/27/25 1447          Vital Signs    Pre Systolic BP Rehab 135  -CK     Pre  Treatment Diastolic BP 83  -CK     Post Systolic BP Rehab 143  -CK     Post Treatment Diastolic BP 87  -CK     Pretreatment Heart Rate (beats/min) 63  -CK     Posttreatment Heart Rate (beats/min) 67  -CK     Pre SpO2 (%) 96  -CK     O2 Delivery Pre Treatment room air  -CK     O2 Delivery Intra Treatment room air  -CK     Post SpO2 (%) 97  -CK     O2 Delivery Post Treatment room air  -CK     Pre Patient Position Supine  -CK     Post Patient Position Supine  -CK       Row Name 03/27/25 1447          Positioning and Restraints    Pre-Treatment Position in bed  -CK     Post Treatment Position bed  -CK     In Bed supine;call light within reach;encouraged to call for assist;notified nsg  no alarm upon entry  -CK               User Key  (r) = Recorded By, (t) = Taken By, (c) = Cosigned By      Initials Name Provider Type    CK Virgie Hernandez, PT Physical Therapist                   Outcome Measures       Row Name 03/27/25 1449 03/27/25 0800       How much help from another person do you currently need...    Turning from your back to your side while in flat bed without using bedrails? 4  -CK 4  -RH    Moving from lying on back to sitting on the side of a flat bed without bedrails? 4  -CK 4  -RH    Moving to and from a bed to a chair (including a wheelchair)? 4  -CK 4  -RH    Standing up from a chair using your arms (e.g., wheelchair, bedside chair)? 4  -CK 4  -RH    Climbing 3-5 steps with a railing? 4  -CK 4  -RH    To walk in hospital room? 4  -CK 4  -RH    AM-PAC 6 Clicks Score (PT) 24  -CK 24  -RH    Highest Level of Mobility Goal 8 --> Walked 250 feet or more  -CK 8 --> Walked 250 feet or more  -RH      Row Name 03/27/25 1449 03/27/25 0943       Modified [place] Scale    Pre-Stroke Modified [place] Scale 6 - Unable to determine (UTD) from the medical record documentation  -CK 6 - Unable to determine (UTD) from the medical record documentation  -CS (r) SJ (t) CS (c)    Modified [place] Scale 1 - No significant  disability despite symptoms.  Able to carry out all usual duties and activities.  -CK 1 - No significant disability despite symptoms.  Able to carry out all usual duties and activities.  -CS (r) SJ (t) CS (c)      Row Name 03/27/25 1449 03/27/25 0943       Functional Assessment    Outcome Measure Options AM-PAC 6 Clicks Basic Mobility (PT);Modified [place]  -CK AM-PAC 6 Clicks Daily Activity (OT);Modified [place]  -CS (r) SJ (t) CS (c)              User Key  (r) = Recorded By, (t) = Taken By, (c) = Cosigned By      Initials Name Provider Type    CS Yousif Gilmore, OT Occupational Therapist    Laura Whitaker, RN Registered Nurse    Virgie Judd, PT Physical Therapist    Yuliet Tong, OT Student OT Student                  Physical Therapy Education       Title: PT OT SLP Therapies (In Progress)       Topic: Physical Therapy (Done)       Point: Mobility training (Done)       Learning Progress Summary            Patient Acceptance, E, VU by CK at 3/27/2025 1449   Significant Other Acceptance, E, VU by CK at 3/27/2025 1449                      Point: Home exercise program (Done)       Learning Progress Summary            Patient Acceptance, E, VU by CK at 3/27/2025 1449   Significant Other Acceptance, E, VU by CK at 3/27/2025 1449                      Point: Body mechanics (Done)       Learning Progress Summary            Patient Acceptance, E, VU by CK at 3/27/2025 1449   Significant Other Acceptance, E, VU by CK at 3/27/2025 1449                      Point: Precautions (Done)       Learning Progress Summary            Patient Acceptance, E, VU by CK at 3/27/2025 1449   Significant Other Acceptance, E, VU by CK at 3/27/2025 1449                                      User Key       Initials Effective Dates Name Provider Type Discipline     02/06/24 -  Virgie Hernandez, PT Physical Therapist PT                  PT Recommendation and Plan     Outcome Evaluation: Patient reports ongoing visual  deficits, but appears to be mobilizing at his functional baseline with ability to navigate unit SBA unsupported and navigate a flight of steps without difficulty. No IPPT needs identified. Will sign off and recommend D/C home when medically appropriate.     Time Calculation:   PT Evaluation Complexity  History, PT Evaluation Complexity: 1-2 personal factors and/or comorbidities  Examination of Body Systems (PT Eval Complexity): total of 3 or more elements  Clinical Presentation (PT Evaluation Complexity): stable  Clinical Decision Making (PT Evaluation Complexity): low complexity  Overall Complexity (PT Evaluation Complexity): low complexity     PT Charges       Row Name 03/27/25 1450             Time Calculation    Start Time 1355  -CK      PT Received On 03/27/25  -CK         Untimed Charges    PT Eval/Re-eval Minutes 40  -CK         Total Minutes    Untimed Charges Total Minutes 40  -CK       Total Minutes 40  -CK                User Key  (r) = Recorded By, (t) = Taken By, (c) = Cosigned By      Initials Name Provider Type    CK Virgie Hernandez, PT Physical Therapist                  Therapy Charges for Today       Code Description Service Date Service Provider Modifiers Qty    [id number]  PT EVAL LOW COMPLEXITY 3 3/27/2025 Virgie Hernandez, PT GP 1            PT G-Codes  Outcome Measure Options: AM-PAC 6 Clicks Basic Mobility (PT), Modified [place]  AM-PAC 6 Clicks Score (PT): 24  AM-PAC 6 Clicks Score (OT): 24  Modified [place] Scale: 1 - No significant disability despite symptoms.  Able to carry out all usual duties and activities.    PT Discharge Summary  Anticipated Discharge Disposition (PT): home    Virgie Hernandez PT  3/27/2025

## 2025-03-27 NOTE — ED PROVIDER NOTES
"Subjective   History of Present Illness  69-year-old male sent to the emergency department by his \"eye doctor\" to be evaluated for \"possible stroke.\"  The patient tells me that 2 days ago he began experiencing acute painless vision loss in his left central and peripheral vision.  He notes a \"gray spot\" in his left peripheral/central vision.  He denies any sensation of cobwebs or a curtain closing.  He denies any ocular pain.  No headaches.  No changes to his speech pattern.  No unilateral weakness or paresthesias.  Given his ongoing symptoms, he saw his \"eye doctor\" today and was subsequently referred here after ophthalmologic exam and pupil dilation for further evaluation and treatment as his provider was concerned about a potential occipital stroke.  Of note, the patient has a history of ocular migraines but denies any headache at this time.  He tells me that he has had similar episodes of transient vision loss before in the past but never anything like this that has sustained.      Review of Systems   Eyes:  Positive for visual disturbance.   All other systems reviewed and are negative.      Past Medical History:   Diagnosis Date    Arthritis     Colon polyps     Hypertension     Kidney stone     Kidney stones     Lithotripsy in past    Stroke     Stroke (cerebrum)        No Known Allergies    Past Surgical History:   Procedure Laterality Date    APPENDECTOMY      EXTRACORPOREAL SHOCK WAVE LITHOTRIPSY (ESWL)         Family History   Problem Relation Age of Onset    Stroke Mother     Cancer Father     Heart disease Paternal Grandfather     Heart disease Maternal Grandfather        Social History     Socioeconomic History    Marital status:    Tobacco Use    Smoking status: Never    Smokeless tobacco: Never   Vaping Use    Vaping status: Never Used   Substance and Sexual Activity    Alcohol use: Yes     Alcohol/week: 5.0 standard drinks of alcohol     Types: 5 Cans of beer per week    Drug use: No "           Objective   Physical Exam  Vitals and nursing note reviewed.   Constitutional:       General: He is not in acute distress.     Appearance: He is well-developed. He is not diaphoretic.   HENT:      Head: Normocephalic and atraumatic.   Eyes:      Comments: No nystagmus, no proptosis, no pain with extraocular movements, pupils are dilated   Neck:      Vascular: No carotid bruit or JVD.   Cardiovascular:      Rate and Rhythm: Normal rate and regular rhythm.      Heart sounds: Normal heart sounds. No murmur heard.     No friction rub. No gallop.   Pulmonary:      Effort: Pulmonary effort is normal. No respiratory distress.      Breath sounds: Normal breath sounds. No wheezing or rales.   Abdominal:      General: Bowel sounds are normal. There is no distension.      Palpations: Abdomen is soft. There is no mass.      Tenderness: There is no abdominal tenderness. There is no guarding.   Musculoskeletal:         General: Normal range of motion.      Cervical back: Normal range of motion.   Skin:     General: Skin is warm and dry.      Coloration: Skin is not pale.      Findings: No erythema or rash.   Neurological:      Mental Status: He is alert and oriented to person, place, and time.      Comments: Awake, alert, and oriented x3, clear and fluent speech, no ataxia or dysmetria, normal gait, neurovascularly intact distally in all fours with bounding distal pulses and normal sensation, 5 out of 5 strength in all fours, no cranial nerve deficits noted with cranial nerves II through XII grossly intact   Psychiatric:         Mood and Affect: Mood normal.         Thought Content: Thought content normal.         Judgment: Judgment normal.         Critical Care    Performed by: Feliciano Moraes MD  Authorized by: Feliciano Moraes MD    Critical care provider statement:     Critical care time (minutes):  36    Critical care was necessary to treat or prevent imminent or life-threatening deterioration of the  "following conditions:  CNS failure or compromise    Critical care was time spent personally by me on the following activities:  Development of treatment plan with patient or surrogate, discussions with consultants, evaluation of patient's response to treatment, examination of patient, obtaining history from patient or surrogate, ordering and performing treatments and interventions, ordering and review of laboratory studies, ordering and review of radiographic studies, pulse oximetry, re-evaluation of patient's condition and review of old charts             ED Course  ED Course as of 03/27/25 0043   Wed Mar 26, 2025   1628 69-year-old male sent to the emergency department by his \"eye doctor\" to be evaluated for \"possible stroke.\"  The patient tells me that 2 days ago he began experiencing acute painless vision loss in his left peripheral vision.  He notes a \"gray spot\" in his left peripheral/central vision.  He denies any sensation of cobwebs or  a curtain closing.  He denies any ocular pain.  No headaches.  No changes to his speech pattern.  No unilateral weakness. [DD]   1630 Given his ongoing symptoms, he saw his \"eye doctor\" today and was sent here to rule out emergent central process.  Of note, the patient does have a history of ocular migraines.  On arrival to the ED, the patient is nontoxic-appearing.  Benign exam.  Broad differential diagnosis.  We will obtain labs and imaging and will reassess following initial interventions.  Initial EKG interpreted independently by me revealed normal sinus rhythm with a heart rate of 66 and no ST segments suggestive of or concerning for ischemia [DD]   1723 Labs are bland/unrevealing. [DD]   1813 I personally and independently reviewed the patient's MRI images and findings, and I am in agreement with the radiologist regarding MRI interpretation--particularly regarding right occipital infarction. [DD]   1813   I notified our stroke team of the patient's MRI findings.  They " will see him in consult.  I discussed the patient's case with our hospitalist, Dr. Zee, the patient will be admitted under her care for further evaluation and treatment.  The patient is hemodynamically stable at this time and is aware/agreeable with the plan. [DD]      ED Course User Index  [DD] Feliciano Moraes MD                                Total (NIH Stroke Scale): 0            Recent Results (from the past 24 hours)   CBC Auto Differential    Collection Time: 03/26/25  4:12 PM    Specimen: Blood   Result Value Ref Range    WBC 5.90 3.40 - 10.80 10*3/mm3    RBC 5.03 4.14 - 5.80 10*6/mm3    Hemoglobin 14.6 13.0 - 17.7 g/dL    Hematocrit 42.5 37.5 - 51.0 %    MCV 84.5 79.0 - 97.0 fL    MCH 29.0 26.6 - 33.0 pg    MCHC 34.4 31.5 - 35.7 g/dL    RDW 13.2 12.3 - 15.4 %    RDW-SD 40.4 37.0 - 54.0 fl    MPV 9.8 6.0 - 12.0 fL    Platelets 236 140 - 450 10*3/mm3    Neutrophil % 65.9 42.7 - 76.0 %    Lymphocyte % 16.3 (L) 19.6 - 45.3 %    Monocyte % 15.1 (H) 5.0 - 12.0 %    Eosinophil % 1.7 0.3 - 6.2 %    Basophil % 0.5 0.0 - 1.5 %    Immature Grans % 0.5 0.0 - 0.5 %    Neutrophils, Absolute 3.89 1.70 - 7.00 10*3/mm3    Lymphocytes, Absolute 0.96 0.70 - 3.10 10*3/mm3    Monocytes, Absolute 0.89 0.10 - 0.90 10*3/mm3    Eosinophils, Absolute 0.10 0.00 - 0.40 10*3/mm3    Basophils, Absolute 0.03 0.00 - 0.20 10*3/mm3    Immature Grans, Absolute 0.03 0.00 - 0.05 10*3/mm3    nRBC 0.0 0.0 - 0.2 /100 WBC   Comprehensive Metabolic Panel    Collection Time: 03/26/25  4:45 PM    Specimen: Blood   Result Value Ref Range    Glucose 86 65 - 99 mg/dL    BUN 15 8 - 23 mg/dL    Creatinine 1.20 0.76 - 1.27 mg/dL    Sodium 139 136 - 145 mmol/L    Potassium 3.6 3.5 - 5.2 mmol/L    Chloride 102 98 - 107 mmol/L    CO2 25.0 22.0 - 29.0 mmol/L    Calcium 9.1 8.6 - 10.5 mg/dL    Total Protein 6.6 6.0 - 8.5 g/dL    Albumin 4.0 3.5 - 5.2 g/dL    ALT (SGPT) 22 1 - 41 U/L    AST (SGOT) 20 1 - 40 U/L    Alkaline Phosphatase 80 39 - 117 U/L     Total Bilirubin 0.4 0.0 - 1.2 mg/dL    Globulin 2.6 gm/dL    A/G Ratio 1.5 g/dL    BUN/Creatinine Ratio 12.5 7.0 - 25.0    Anion Gap 12.0 5.0 - 15.0 mmol/L    eGFR 65.9 >60.0 mL/min/1.73   Magnesium    Collection Time: 03/26/25  4:45 PM    Specimen: Blood   Result Value Ref Range    Magnesium 2.0 1.6 - 2.4 mg/dL   POC Glucose Once    Collection Time: 03/26/25 11:43 PM    Specimen: Blood   Result Value Ref Range    Glucose 127 70 - 130 mg/dL     Note: In addition to lab results from this visit, the labs listed above may include labs taken at another facility or during a different encounter within the last 24 hours. Please correlate lab times with ED admission and discharge times for further clarification of the services performed during this visit.    CT Angiogram Head   Final Result   Impression:   No evidence of hemodynamically significant stenosis, AVM or aneurysm within the head or neck by NASCET criteria.                  Electronically Signed: Liliam Jama MD     3/27/2025 12:08 AM EDT     Workstation ID: [id number]      CT Angiogram Neck   Final Result   Impression:   No evidence of hemodynamically significant stenosis, AVM or aneurysm within the head or neck by NASCET criteria.                  Electronically Signed: Liliam Jama MD     3/27/2025 12:08 AM EDT     Workstation ID: [id number]      MRI Brain Without Contrast   Final Result   Impression:   1.Acute to subacute right occipital infarction.            Electronically Signed: Apolinar Sutton MD     3/26/2025 6:04 PM EDT     Workstation ID: [id number]        Vitals:    03/26/25 2000 03/26/25 2020 03/26/25 2100 03/26/25 2300   BP: 141/86 140/86  133/90   BP Location:  Left arm  Left arm   Patient Position:  Lying  Lying   Pulse: 62 61 61 76   Resp:  16  16   Temp:  98.1 °F (36.7 °C)  98 °F (36.7 °C)   TempSrc:  Oral  Oral   SpO2: 95% 97% 95% 96%   Weight:  92.8 kg (204 lb 9.6 oz)     Height:         Medications   sodium chloride 0.9 % flush 10 mL ( Intravenous  Canceled Entry 3/26/25 2136)   sodium chloride 0.9 % flush 10 mL (has no administration in time range)   sodium chloride 0.9 % infusion 40 mL (has no administration in time range)   atorvastatin (LIPITOR) tablet 80 mg (80 mg Oral Given 3/26/25 2127)   aspirin chewable tablet 81 mg (81 mg Oral Given 3/26/25 1914)     Or   aspirin suppository 300 mg ( Rectal Not Given:  See Alt 3/26/25 1914)   clopidogrel (PLAVIX) tablet 75 mg (75 mg Oral Given 3/26/25 1914)   sodium chloride 0.9 % flush 10 mL (10 mL Intravenous Given 3/26/25 2136)   sodium chloride 0.9 % flush 10 mL (has no administration in time range)   sodium chloride 0.9 % infusion 40 mL (has no administration in time range)   nitroglycerin (NITROSTAT) SL tablet 0.4 mg (has no administration in time range)   acetaminophen (TYLENOL) tablet 500 mg (has no administration in time range)     Or   acetaminophen (TYLENOL) 160 MG/5ML oral solution 500 mg (has no administration in time range)     Or   acetaminophen (TYLENOL) suppository 650 mg (has no administration in time range)   sennosides-docusate (PERICOLACE) 8.6-50 MG per tablet 2 tablet (has no administration in time range)     And   polyethylene glycol (MIRALAX) packet 17 g (has no administration in time range)     And   bisacodyl (DULCOLAX) EC tablet 5 mg (has no administration in time range)     And   bisacodyl (DULCOLAX) suppository 10 mg (has no administration in time range)   Magnesium Standard Dose Replacement - Follow Nurse / BPA Driven Protocol (has no administration in time range)   folic acid (FOLVITE) tablet 1 mg (has no administration in time range)   hydroCHLOROthiazide tablet 12.5 mg (has no administration in time range)   felodipine (PLENDIL) 24 hr tablet 10 mg (has no administration in time range)   losartan (COZAAR) tablet 50 mg ( Oral Unheld by provider 3/26/25 231)   terazosin (HYTRIN) capsule 5 mg ( Oral Dose Auto Held 4/11/25 2100)   iopamidol (ISOVUE-370) 76 % injection 75 mL (75 mL  Intravenous Given 3/26/25 5786)     ECG/EMG Results (last 24 hours)       ** No results found for the last 24 hours. **          ECG 12 Lead Stroke Evaluation    (Results Pending)                 Medical Decision Making  Amount and/or Complexity of Data Reviewed  Labs: ordered.  Radiology: ordered.  ECG/medicine tests: ordered.    Risk  Decision regarding hospitalization.        Final diagnoses:   Acute ischemic stroke       ED Disposition  ED Disposition       ED Disposition   Decision to Admit    Condition   --    Comment   Level of Care: Telemetry [5]   Diagnosis: Occipital infarction [269282]   Admitting Physician: GAVIN CORONADO [[id number]]   Attending Physician: GAVIN CORONADO [[id number]]   Is patient appropriate for Inpatient Observation Unit?: No [0]                 No follow-up provider specified.       Medication List      No changes were made to your prescriptions during this visit.            Feliciano Moraes MD  03/27/25 0045

## 2025-03-27 NOTE — PLAN OF CARE
Goal Outcome Evaluation:  Plan of Care Reviewed With: patient           Outcome Evaluation: Patient reports ongoing visual deficits, but appears to be mobilizing at his functional baseline with ability to navigate unit SBA unsupported and navigate a flight of steps without difficulty. No IPPT needs identified. Will sign off and recommend D/C home when medically appropriate.    Anticipated Discharge Disposition (PT): home

## 2025-03-27 NOTE — THERAPY DISCHARGE NOTE
Acute Care - Occupational Therapy Discharge  [facility]    Patient Name: Jonnie Hollis  : 1955    MRN: [id number]                              Today's Date: 3/27/2025       Admit Date: 3/26/2025    Visit Dx:     ICD-10-CM ICD-9-CM   1. Acute ischemic stroke  I63.9 434.91     Patient Active Problem List   Diagnosis    Hypertension    CVA (cerebral vascular accident)    Borderline hyperlipidemia    Vitamin D deficiency    Polycythemia    Occipital infarction     Past Medical History:   Diagnosis Date    Arthritis     Colon polyps     Hypertension     Kidney stone     Kidney stones     Lithotripsy in past    Stroke     Stroke (cerebrum)      Past Surgical History:   Procedure Laterality Date    APPENDECTOMY      EXTRACORPOREAL SHOCK WAVE LITHOTRIPSY (ESWL)        General Information       Row Name 25 0929          OT Time and Intention    Document Type discharge evaluation/summary (P)   -     Mode of Treatment occupational therapy (P)   -       Row Name 25 0929          General Information    Patient Profile Reviewed yes (P)   -SJ     Prior Level of Function independent:;community mobility;all household mobility;ADL's;feeding;dressing;grooming;bathing;home management;driving (P)   -     Existing Precautions/Restrictions other (see comments) (P)   visual deficit  -     Barriers to Rehab visual deficit;medically complex (P)   -       Row Name 25 0929          Living Environment    Current Living Arrangements home (P)   -     People in Home spouse (P)   -       Row Name 25 0929          Home Main Entrance    Number of Stairs, Main Entrance none (P)   -       Row Name 25 0929          Cognition    Orientation Status (Cognition) oriented to;person;place;time (P)   -       Row Name 2529          Safety Issues/Impairments Affecting Functional Mobility    Impairments Affecting Function (Mobility) visual/perceptual (P)   -               User Key  (r) =  Recorded By, (t) = Taken By, (c) = Cosigned By      Initials Name Provider Type    Yuliet Tong, OT Student OT Student                   Mobility/ADL's       Row Name 03/27/25 0933          Bed Mobility    Bed Mobility supine-sit;sit-supine (P)   -SJ     Supine-Sit [place] (Bed Mobility) independent (P)   -     Sit-Supine [place] (Bed Mobility) independent (P)   -       Row [facility] 03/27/25 0933          Transfers    Transfers sit-stand transfer;stand-sit transfer (P)   -       Row Name 03/27/25 09          Sit-Stand Transfer    Sit-Stand [place] (Transfers) independent (P)   -       Row Name 03/27/25 09          Stand-Sit Transfer    Stand-Sit [place] (Transfers) independent (P)   -[facility] 03/27/25 09          Functional Mobility    Functional Mobility- Ind. Level independent (P)   -     Functional Mobility- Comment HH distance (P)   -     Patient was able to Ambulate yes (P)   -[facility] 03/27/25 0933          Activities of Daily Living    BADL Assessment/Intervention lower body dressing (P)   -[facility] 03/27/25 0933          Lower Body Dressing Assessment/Training    [place] Level (Lower Body Dressing) don;doff;pants/bottoms;independent (P)   -     Position (Lower Body Dressing) unsupported standing (P)   -               User Key  (r) = Recorded By, (t) = Taken By, (c) = Cosigned By      Initials Name Provider Type    Yuliet Tong, OT Student OT Student                   Obj/Interventions       Row Name 03/27/25 0935          Sensory Assessment (Somatosensory)    Sensory Assessment (Somatosensory) UE sensation intact (P)   -SJ       Row Name 03/27/25 0935          Vision Assessment/Intervention    Visual Impairment/Limitations WFL (P)   -     Vision Assessment Comment Pt has baseline intermittent visual disturbance in central visual field during focused tasks. (P)   -[facility] Name 03/27/25 0935          Range of Motion  Comprehensive    General Range of Motion bilateral upper extremity ROM WFL (P)   -SJ       Row Name 03/27/25 0935          Strength Comprehensive (MMT)    General Manual Muscle Testing (MMT) Assessment no strength deficits identified (P)   -SJ     Comment, General Manual Muscle Testing (MMT) Assessment BUE grossly 4/5 (P)   -SJ       Row Name 03/27/25 0935          Motor Skills    Motor Skills coordination (P)   -     Coordination WFL (P)   -[facility] Name 03/27/25 0935          Balance    Balance Assessment sitting static balance;sitting dynamic balance;sit to stand dynamic balance;standing static balance;standing dynamic balance (P)   -SJ     Static Sitting Balance independent (P)   -SJ     Dynamic Sitting Balance independent (P)   -SJ     Position, Sitting Balance unsupported;sitting edge of bed (P)   -SJ     Sit to Stand Dynamic Balance independent (P)   -SJ     Static Standing Balance independent (P)   -SJ     Dynamic Standing Balance independent (P)   -SJ     Position/Device Used, Standing Balance unsupported (P)   -SJ     Balance Interventions sitting;standing;sit to stand;supported;static;dynamic;minimal challenge;occupation based/functional task (P)   -SJ               User Key  (r) = Recorded By, (t) = Taken By, (c) = Cosigned By      Initials Name Provider Type    SJ Yuliet Cordoba, OT Student OT Student                   Goals/Plan    No documentation.                  Clinical Impression       Row Name 03/27/25 0937          Pain Assessment    Pretreatment Pain Rating 0/10 - no pain (P)   -SJ     Posttreatment Pain Rating 0/10 - no pain (P)   -SJ       [facility] Name 03/27/25 0937          Plan of Care Review    Plan of Care Reviewed With patient;spouse (P)   -SJ     Outcome Evaluation Pt presents near baseline level of function and able to perform ADLs independently. Pt completed LB dressing ind. in unsupported standing and ambulated HH distance ind. with LOB. OT signing off, please reconsult if  needed. Recommend home at d/c. (P)   -       Row Name 03/27/25 0937          Therapy Assessment/Plan (OT)    Patient/Family Therapy Goal Statement (OT) Return to baseline (P)   -SJ     Criteria for Skilled Therapeutic Interventions Met (OT) no;no problems identified which require skilled intervention (P)   -SJ       Row Name 03/27/25 0937          Therapy Plan Review/Discharge Plan (OT)    Anticipated Discharge Disposition (OT) home (P)   -       Row Name 03/27/25 0937          Vital Signs    Post Systolic BP Rehab 145 (P)   -SJ     Post Treatment Diastolic BP 89 (P)   -SJ     Posttreatment Heart Rate (beats/min) 67 (P)   -SJ     Post SpO2 (%) 98 (P)   -SJ     O2 Delivery Post Treatment room air (P)   -SJ     Pre Patient Position Sitting (P)   Sitting EOB  -SJ     Intra Patient Position Standing (P)   -SJ     Post Patient Position Supine (P)   -SJ       Row Name 03/27/25 0937          Positioning and Restraints    Pre-Treatment Position in bed (P)   -SJ     Post Treatment Position bed (P)   -SJ     In Bed notified nsg;sitting;call light within reach;encouraged to call for assist;with family/caregiver;side rails up x2 (P)   Pt ad alisa per nsg  -SJ               User Key  (r) = Recorded By, (t) = Taken By, (c) = Cosigned By      Initials Name Provider Type    Yuliet Tong, OT Student OT Student                   Outcome Measures       Row Name 03/27/25 0912          How much help from another is currently needed...    Putting on and taking off regular lower body clothing? 4 (P)   -SJ     Bathing (including washing, rinsing, and drying) 4 (P)   -SJ     Toileting (which includes using toilet bed pan or urinal) 4 (P)   -SJ     Putting on and taking off regular upper body clothing 4 (P)   -SJ     Taking care of personal grooming (such as brushing teeth) 4 (P)   -SJ     Eating meals 4 (P)   -SJ     AM-PAC 6 Clicks Score (OT) 24 (P)   -SJ       Row Name 03/27/25 0994          Modified [place] Scale    Pre-Stroke  Modified [place] Scale 6 - Unable to determine (UTD) from the medical record documentation (P)   -     Modified [place] Scale 1 - No significant disability despite symptoms.  Able to carry out all usual duties and activities. (P)   -       Row Name 03/27/25 0943          Functional Assessment    Outcome Measure Options AM-PAC 6 Clicks Daily Activity (OT);Modified [place] (P)   -               User Key  (r) = Recorded By, (t) = Taken By, (c) = Cosigned By      Initials Name Provider Type     Yuliet Cordoba, OT Student OT Student                  Occupational Therapy Education       Title: PT OT SLP Therapies (In Progress)       Topic: Occupational Therapy (In Progress)       Point: ADL training (Done)       Learning Progress Summary            Patient Eager, E, VU by  at 3/27/2025 0844                      Point: Precautions (Done)       Learning Progress Summary            Patient Eager, E, VU by  at 3/27/2025 0844                      Point: Body mechanics (Done)       Learning Progress Summary            Patient Eager, E, VU by  at 3/27/2025 0844                                      User Key       Initials Effective Dates Name Provider Type [facility] 02/13/25 -  Yuliet Cordoba, OT Student OT Student OT                  OT Recommendation and Plan     Plan of Care Review  Plan of Care Reviewed With: (P) patient, spouse  Outcome Evaluation: (P) Pt presents near baseline level of function and able to perform ADLs independently. Pt completed LB dressing ind. in unsupported standing and ambulated HH distance ind. with LOB. OT signing off, please reconsult if needed. Recommend home at d/c.  Plan of Care Reviewed With: (P) patient, spouse  Outcome Evaluation: (P) Pt presents near baseline level of function and able to perform ADLs independently. Pt completed LB dressing ind. in unsupported standing and ambulated HH distance ind. with LOB. OT signing off, please reconsult if needed. Recommend home at  d/c.     Time Calculation:   Evaluation Complexity (OT)  Review Occupational Profile/Medical/Therapy History Complexity: (P) brief/low complexity  Assessment, Occupational Performance/Identification of Deficit Complexity: (P) 1-3 performance deficits  Clinical Decision Making Complexity (OT): (P) problem focused assessment/low complexity  Overall Complexity of Evaluation (OT): (P) low complexity     Time Calculation- OT       Row Name 03/27/25 0944             Time Calculation- OT    OT Start Time 0854 (P)   -      OT Received On 03/27/25 (P)   -         Untimed Charges    OT Eval/Re-eval Minutes 48 (P)   -         Total Minutes    Untimed Charges Total Minutes 48 (P)   -       Total Minutes 48 (P)   -                User Key  (r) = Recorded By, (t) = Taken By, (c) = Cosigned By      Initials Name Provider Type     Yuliet Cordoba, OT Student OT Student                  Therapy Charges for Today       Code Description Service Date Service Provider Modifiers Qty    [id number]  OT EVAL LOW COMPLEXITY 4 3/27/2025 Yuliet Cordoba, OT Student GO 1               OT Discharge Summary  Anticipated Discharge Disposition (OT): (P) home  Reason for Discharge: (P) Independent  Discharge Destination: (P) Home    Yuliet Cordoba OT Student  3/27/2025

## 2025-03-27 NOTE — PROGRESS NOTES
"    [facility] Medicine Services  PROGRESS NOTE    Patient Name: Jonnie Hollis  : 1955  MRN: [id number]    Date of Admission: 3/26/2025  Primary Care Physician: Anthony Mcdermott MD    Subjective   Subjective     CC: Visual disturbances    HPI: Still has \"gray\" spots in vision. No other neurologic deficits.       Objective   Objective     Vital Signs:   Temp:  [97.8 °F (36.6 °C)-98.9 °F (37.2 °C)] 97.9 °F (36.6 °C)  Heart Rate:  [51-80] 60  Resp:  [16-18] 18  BP: (130-143)/(79-91) 138/90     Physical Exam:  NAD alert and oriented  OP clear, dry MM  Neck supple  No LAD  RRR  CTAB  +BS, soft  CHANDLER  Normal affect  No gross neurologic deficits beyond visual disturbances    Results Reviewed:  LAB RESULTS:      Lab 25  0413 25  1612   WBC 4.93 5.90   HEMOGLOBIN 14.2 14.6   HEMATOCRIT 42.3 42.5   PLATELETS 232 236   NEUTROS ABS  --  3.89   IMMATURE GRANS (ABS)  --  0.03   LYMPHS ABS  --  0.96   MONOS ABS  --  0.89   EOS ABS  --  0.10   MCV 85.6 84.5         Lab 25  0413 25  1645   SODIUM 142 139   POTASSIUM 3.8 3.6   CHLORIDE 106 102   CO2 24.0 25.0   ANION GAP 12.0 12.0   BUN 15 15   CREATININE 1.08 1.20   EGFR 74.7 65.9   GLUCOSE 92 86   CALCIUM 8.9 9.1   MAGNESIUM  --  2.0   HEMOGLOBIN A1C 5.20  --          Lab 25  1645   TOTAL PROTEIN 6.6   ALBUMIN 4.0   GLOBULIN 2.6   ALT (SGPT) 22   AST (SGOT) 20   BILIRUBIN 0.4   ALK PHOS 80             Lab 25  0413   CHOLESTEROL 157   LDL CHOL 95   HDL CHOL 40   TRIGLYCERIDES 120             Brief Urine Lab Results       None            Microbiology Results Abnormal       None            CT Angiogram Head  Result Date: 3/27/2025  CT ANGIOGRAM HEAD, CT ANGIOGRAM NECK Date of Exam: 3/26/2025 11:50 PM EDT Indication: occipital stroke; eval vasculature. Comparison: 3/26/2025. Technique: CTA of the head and neck was performed after the uneventful intravenous administration of intravenous contrast. " Reconstructed coronal and sagittal images were also obtained. In addition, a 3-D volume rendered image was created for interpretation. Automated exposure control and iterative reconstruction methods were used. Findings: No evidence of hemodynamically significant stenosis, AVM or aneurysm within the head or neck by NASCET criteria. Right posterior cerebral and posterior inferior cerebellar arteries appear patent. Atherosclerotic calcifications are present along the carotid bifurcations bilaterally which appear superficial. No large vessel occlusion identified. No evidence of dissection. Soft tissues of the neck demonstrate no acute process. No acute osseous abnormality identified. Lung apices are clear.     Impression: Impression: No evidence of hemodynamically significant stenosis, AVM or aneurysm within the head or neck by NASCET criteria. Electronically Signed: Liliam Jama MD  3/27/2025 12:08 AM EDT  Workstation ID: [id number]    CT Angiogram Neck  Result Date: 3/27/2025  CT ANGIOGRAM HEAD, CT ANGIOGRAM NECK Date of Exam: 3/26/2025 11:50 PM EDT Indication: occipital stroke; eval vasculature. Comparison: 3/26/2025. Technique: CTA of the head and neck was performed after the uneventful intravenous administration of intravenous contrast. Reconstructed coronal and sagittal images were also obtained. In addition, a 3-D volume rendered image was created for interpretation. Automated exposure control and iterative reconstruction methods were used. Findings: No evidence of hemodynamically significant stenosis, AVM or aneurysm within the head or neck by NASCET criteria. Right posterior cerebral and posterior inferior cerebellar arteries appear patent. Atherosclerotic calcifications are present along the carotid bifurcations bilaterally which appear superficial. No large vessel occlusion identified. No evidence of dissection. Soft tissues of the neck demonstrate no acute process. No acute osseous abnormality identified. Lung  apices are clear.     Impression: Impression: No evidence of hemodynamically significant stenosis, AVM or aneurysm within the head or neck by NASCET criteria. Electronically Signed: Liliam Jama MD  3/27/2025 12:08 AM EDT  Workstation ID: [id number]    MRI Brain Without Contrast  Result Date: 3/26/2025  MRI BRAIN WO CONTRAST Date of Exam: 3/26/2025 5:40 PM EDT Indication: L peripheral vision loss.  Comparison: None available. Technique:  Routine multiplanar/multisequence sequence images of the brain were obtained without contrast administration. Findings: There is an area of restricted diffusion involving the right occipital lobe with corresponding signal abnormality on FLAIR sequence consistent with acute to subacute infarction. There is no evidence of acute or chronic intracranial hemorrhage. No mass effect or midline shift. No abnormal extra-axial collections. There is mild to moderate nonspecific periventricular and subcortical white matter signal abnormality otherwise most frequently seen in setting of chronic microvascular ischemic disease. The basal ganglia, brainstem and cerebellum appear within normal limits. Midline structures are intact. No additional significant cortical abnormality is identified. Calvarial and superficial soft tissue signal is within normal limits. Orbits appear unremarkable. The paranasal sinuses and the mastoid air cells appear well aerated.     Impression: Impression: 1.Acute to subacute right occipital infarction. Electronically Signed: Apolinar Sutton MD  3/26/2025 6:04 PM EDT  Workstation ID: [id number]          Current medications:  Scheduled Meds:aspirin, 81 mg, Oral, Daily   Or  aspirin, 300 mg, Rectal, Daily  atorvastatin, 80 mg, Oral, Nightly  [START ON 3/28/2025] clopidogrel, 75 mg, Oral, Daily  felodipine, 10 mg, Oral, Q24H  folic acid, 1 mg, Oral, Daily  hydroCHLOROthiazide, 12.5 mg, Oral, Daily  losartan, 50 mg, Oral, Q12H  sodium chloride, 10 mL, Intravenous, Q12H  sodium  chloride, 10 mL, Intravenous, Q12H  [Held by provider] terazosin, 5 mg, Oral, Nightly      Continuous Infusions:   PRN Meds:.  acetaminophen **OR** acetaminophen **OR** acetaminophen    senna-docusate sodium **AND** polyethylene glycol **AND** bisacodyl **AND** bisacodyl    Magnesium Standard Dose Replacement - Follow Nurse / BPA Driven Protocol    nitroglycerin    sodium chloride    sodium chloride    sodium chloride    sodium chloride    Assessment & Plan   Assessment & Plan     Active Hospital Problems    Diagnosis  POA    **Occipital infarction [I63.9]  Yes      Resolved Hospital Problems   No resolved problems to display.        Brief Hospital Course to date:  Jonnie Hollis is a 69 y.o. male here with occipital stroke    Occipital stroke/visual disturbances  -EKG pending  -ECHO pending  -on DAPT/statin  -further recommendations per stroke team, including outpatient cardiac monitoring    HTN  -permissive per stroke protocol    HL  -statin    Alcohol use      Expected Discharge Location and Transportation: Home  Expected Discharge   Expected Discharge Date: 3/27/2025; Expected Discharge Time:      VTE Prophylaxis:  Mechanical VTE prophylaxis orders are present.         AM-PAC 6 Clicks Score (PT): 24 (03/26/25 2200)    CODE STATUS:   Code Status and Medical Interventions: CPR (Attempt to Resuscitate); Full Support   Ordered at: 03/26/25 2158     Code Status (Patient has no pulse and is not breathing):    CPR (Attempt to Resuscitate)     Medical Interventions (Patient has pulse or is breathing):    Full Support     Level Of Support Discussed With:    Patient       Johnnie Jessica MD  03/27/25

## 2025-03-27 NOTE — THERAPY EVALUATION
Acute Care - Speech Language Pathology Initial Evaluation  [facility]  Cognitive-Communication Evaluation       Patient Name: Jonnie Hollis  : 1955  MRN: [id number]  Today's Date: 3/27/2025               Admit Date: 3/26/2025     Visit Dx:    ICD-10-CM ICD-9-CM   1. Acute ischemic stroke  I63.9 434.91     Patient Active Problem List   Diagnosis    Hypertension    CVA (cerebral vascular accident)    Borderline hyperlipidemia    Vitamin D deficiency    Polycythemia    Occipital infarction     Past Medical History:   Diagnosis Date    Arthritis     Colon polyps     Hypertension     Kidney stone     Kidney stones     Lithotripsy in past    Stroke     Stroke (cerebrum)      Past Surgical History:   Procedure Laterality Date    APPENDECTOMY      EXTRACORPOREAL SHOCK WAVE LITHOTRIPSY (ESWL)         SLP Recommendation and Plan  SLP Diagnosis: functional speech/language skills, functional cognitive-linguistic skills (25 1140)  SLP Diagnosis Comments: Speech, language & cognitive linguistic abilties are grossly functional per this eval. No acute needs @ this time, SLP will sign off for communication (25 1140)           SLC Criteria for Skilled Therapy Interventions Met: no problems identified which require skilled intervention (25 1140)  Anticipated Discharge Disposition (SLP): home (25 1140)        Therapy Frequency (SLP SLC): evaluation only (25 1140)                                       SLP EVALUATION (Last 72 Hours)       SLP SLC Evaluation       Row Name 25 1140                   Communication Assessment/Intervention    Document Type evaluation  -MH        Subjective Information no complaints  -MH        Patient Observations alert;cooperative  -MH        Patient/Family/Caregiver Comments/Observations Spouse present  -MH        Patient Effort good  -MH        Symptoms Noted During/After Treatment none  -MH           General Information    Patient Profile Reviewed yes   "-        Pertinent History Of Current Problem Adm w/ visual deficits. Hx: ocular migraines, HTN, HLD. MRI: acute-subacute R occipital infarct  -        Precautions/Limitations, Vision other (see comments)  Pt cont with c/o \"blurry spot\" in center of visual field  -        Precautions/Limitations, Hearing WFL;for purposes of eval  -        Prior Level of Function-Communication unknown  -        Plans/Goals Discussed with patient;spouse/S.O.;agreed upon  -        Barriers to Rehab none identified  -        Patient's Goals for Discharge patient did not state  -        Family Goals for Discharge family did not state  -           Comprehension Assessment/Intervention    Comprehension Assessment/Intervention Reading Comprehension;Auditory Comprehension  -           Auditory Comprehension Assessment/Intervention    Auditory Comprehension (Communication) [facility]  -        Able to Identify Objects/Pictures (Communication) body part;familiar objects;[facility]  -        Answers Questions (Communication) yes/no; questions;personal;simple;concrete;[facility]  -        Able to Follow Commands (Communication) 1-step;2-step;[facility]  -        Narrative Discourse simple paragraph level;[facility]  -           Reading Comprehension Assessment/Intervention    Reading Comprehension (Communication) WFL;other (see comments)  Pt about complete functional reading task despite reported vision deficits  -        Scanning (Reading) paragraphs;[facility]  -        Visual Matching Ability (Reading) picture/word;object/word;word/word;[facility]  -        Functional Reading Tasks [facility]  -           Expression Assessment/Intervention    Expression Assessment/Intervention graphic expression;verbal expression  -           Verbal Expression Assessment/Intervention    Verbal Expression [facility]        Automatic Speech (Communication) response to greeting;days of week;months of year;[facility]  -        Repetition sentences;[facility]  -        Phrase Completion " automatic/predictable;unpredictable;[facility]  -        Responsive Naming simple;complex;[facility]  -        Confrontational Naming high frequency;low frequency;[facility]  -        Spontaneous/Functional Words simple;complex;[facility]  -        Sentence Formulation simple;complex;[facility]  -        Conversational Discourse/Fluency [facility]           Graphic Expression Assessment/Intervention    Graphic Expression [facility]           Motor Speech Assessment/Intervention    Motor Speech Function [facility]           Cognitive Assessment Intervention- SLP    Cognitive Function (Cognition) [facility]        Orientation Status (Cognition) awareness of basic personal information;person;place;time;situation;[facility]        Memory (Cognitive) simple;immediate;delayed;[facility]  -        Attention (Cognitive) selective;sustained;[facility]  -        Thought Organization (Cognitive) concrete divergent;concrete convergent;abstract divergent;abstract convergent;[facility]        Reasoning (Cognitive) simple;[facility]        Problem Solving (Cognitive) simple;divergent;temporal;[facility]        Functional Math (Cognitive) simple;complex;word problems;money calculation;[facility]           SLP Evaluation Clinical Impressions    SLP Diagnosis functional speech/language skills;functional cognitive-linguistic skills  -        SLP Diagnosis Comments Speech, language & cognitive linguistic abilties are grossly functional per this eval. No acute needs @ this time, SLP will sign off for communication  -        SLC Criteria for Skilled Therapy Interventions Met no problems identified which require skilled intervention  -           Recommendations    Therapy Frequency (SLP SLC) evaluation only  -        Anticipated Discharge Disposition (SLP) home  -                  User Key  (r) = Recorded By, (t) = Taken By, (c) = Cosigned By      Initials Name Effective Dates    Cheryl De Leon, MS [facility]-SLP 05/12/23 -                        EDUCATION  The patient has been  educated in the following areas:     Cognitive Impairment Communication Impairment.                        Time Calculation:      Time Calculation- SLP       Row Name 03/27/25 1339             Time Calculation- SLP    SLP Start Time 1140  -      SLP Received On 03/27/25  -         Untimed Charges    [id number]-JH Eval Speech and Production w/ Language Minutes 41  -MH         Total Minutes    Untimed Charges Total Minutes 41  -       Total Minutes 41  -MH                User Key  (r) = Recorded By, (t) = Taken By, (c) = Cosigned By      Initials Name Provider Type     Cheryl Edward, MS CCC-SLP Speech and Language Pathologist                    Therapy Charges for Today       Code Description Service Date Service Provider Modifiers Qty    [id number] HC ST EVAL SPEECH AND PROD W LANG  3 3/27/2025 Cheryl Edward MS CCC-SLP GN 1                       Cheryl Edward MS CCC-DANNI  3/27/2025

## 2025-03-27 NOTE — PLAN OF CARE
Goal Outcome Evaluation:                   Anticipated Discharge Disposition (SLP): home

## 2025-03-27 NOTE — PLAN OF CARE
Goal Outcome Evaluation:  Plan of Care Reviewed With: (P) patient, spouse           Outcome Evaluation: (P) Pt presents near baseline level of function and able to perform ADLs independently. Pt completed LB dressing ind. in unsupported standing and ambulated HH distance ind. with LOB. OT signing off, please reconsult if needed. Recommend home at d/c.    Anticipated Discharge Disposition (OT): (P) home

## 2025-03-27 NOTE — H&P
[facility]  HISTORY AND PHYSICAL    Patient Name: Jonnie Hollis  : 1955  MRN: [id number]  Primary Care Physician: Anthony Mcdermott MD  Date of admission: 3/26/2025      Subjective   Subjective     Chief Complaint:  Stroke     HPI:   69-year-old white male, right-handed with significant health diagnosis for hypertension, hyperlipidemia, arthritis, ocular migraine, kidney stones who presents to [facility] ED accompanied by his wife as a referral from his eye doctor for evaluation of ocular migraine with visual deficit.  Neurology stroke is consulted at the ED as symptoms has been going on for 2 days.  MRI was conducted as a part of the ED workup that revealed right temporal occipital acute infarct.  CTA, head and neck ordered and pending for evaluation of patient's intra extracranial vessels. No intervention recommended.     Patient seen with wife at the bedside. Permission obtained to discuss patient's medical care/condition. Denies N/V/F/C. Reports took all medications this morning. Reports still having visual impairments where there are gray areas in both eyes. Reports drinks 2 glasses of wine at night.         Personal History     Past Medical History:   Diagnosis Date    Arthritis     Colon polyps     Hypertension     Kidney stone     Kidney stones     Lithotripsy in past    Stroke     Stroke (cerebrum)            Past Surgical History:   Procedure Laterality Date    APPENDECTOMY      EXTRACORPOREAL SHOCK WAVE LITHOTRIPSY (ESWL)         Family History: family history includes Cancer in his father; Heart disease in his maternal grandfather and paternal grandfather; Stroke in his mother.     Social History:  reports that he has never smoked. He has never used smokeless tobacco. He reports current alcohol use of about 5.0 standard drinks of alcohol per week. He reports that he does not use drugs.  Social History     Social History Narrative    Not on file        Medications:  Available home medication information reviewed.  aspirin, doxazosin, felodipine, hydroCHLOROthiazide, losartan, and sildenafil    No Known Allergies    Objective   Objective     Vital Signs:   Temp:  [98.1 °F (36.7 °C)-98.9 °F (37.2 °C)] 98.1 °F (36.7 °C)  Heart Rate:  [60-80] 61  Resp:  [16-18] 16  BP: (130-143)/(79-91) 140/86  Total (NIH Stroke Scale): 0    Physical Exam   Constitutional:  male no acute distress, awake, alert  HENT: NCAT, mucous membranes moist  Respiratory: Clear to auscultation bilaterally, respiratory effort normal   Cardiovascular: RRR, no murmurs, rubs, or gallops  Gastrointestinal: Positive bowel sounds, soft, nontender, nondistended  Musculoskeletal: No bilateral ankle edema  Psychiatric: Appropriate affect, cooperative  Neurologic: Oriented x 3, strength symmetric in all extremities, tongue midline  speech clear  Skin: No rashes    Result Review:  I have personally reviewed the results from the time of this admission to 3/26/2025 23:18 EDT and agree with these findings:  [x]  Laboratory list / accordion  []  Microbiology  [x]  Radiology  []  EKG/Telemetry   []  Cardiology/Vascular   []  Pathology  []  Old records  []  Other:        LAB RESULTS:      Lab 03/26/25  1612   WBC 5.90   HEMOGLOBIN 14.6   HEMATOCRIT 42.5   PLATELETS 236   NEUTROS ABS 3.89   IMMATURE GRANS (ABS) 0.03   LYMPHS ABS 0.96   MONOS ABS 0.89   EOS ABS 0.10   MCV 84.5         Lab 03/26/25  1645   SODIUM 139   POTASSIUM 3.6   CHLORIDE 102   CO2 25.0   ANION GAP 12.0   BUN 15   CREATININE 1.20   EGFR 65.9   GLUCOSE 86   CALCIUM 9.1   MAGNESIUM 2.0         Lab 03/26/25  1645   TOTAL PROTEIN 6.6   ALBUMIN 4.0   GLOBULIN 2.6   ALT (SGPT) 22   AST (SGOT) 20   BILIRUBIN 0.4   ALK PHOS 80                         Microbiology Results (last 10 days)       ** No results found for the last 240 hours. **            MRI Brain Without Contrast  Result Date: 3/26/2025  MRI BRAIN WO CONTRAST Date of Exam:  3/26/2025 5:40 PM EDT Indication: L peripheral vision loss.  Comparison: None available. Technique:  Routine multiplanar/multisequence sequence images of the brain were obtained without contrast administration. Findings: There is an area of restricted diffusion involving the right occipital lobe with corresponding signal abnormality on FLAIR sequence consistent with acute to subacute infarction. There is no evidence of acute or chronic intracranial hemorrhage. No mass effect or midline shift. No abnormal extra-axial collections. There is mild to moderate nonspecific periventricular and subcortical white matter signal abnormality otherwise most frequently seen in setting of chronic microvascular ischemic disease. The basal ganglia, brainstem and cerebellum appear within normal limits. Midline structures are intact. No additional significant cortical abnormality is identified. Calvarial and superficial soft tissue signal is within normal limits. Orbits appear unremarkable. The paranasal sinuses and the mastoid air cells appear well aerated.     Impression: Impression: 1.Acute to subacute right occipital infarction. Electronically Signed: Apolinar Sutton MD  3/26/2025 6:04 PM EDT  Workstation ID: [id number]          Assessment & Plan   Assessment & Plan     69-year-old white male, right-handed with multiple vascular risk factor presents to [facility] ED as a referral from his ophthalmologist for evaluation of ocular migraine and to rule out stroke. Symptoms have been persistent for 2 days. MRI as a part of ED workup revealed that patient has right occipital right temporal acute infarct. Stroke saw in ED. No intervention.     Opaque objects in the visual field bilateral.  Acute right temporal/occipital infarct  -TIA/CVA order set without thrombolytic therapy has been initiated  -passed nursing dysphagia   -stroke team following   -MRI brain ordered and revealed a right occipital, right temporal acute infarct  -CTA head and neck  ordered and pending to evaluate patient's vessels and able to determine cause  -TTE ordered and pending  -A1c and lipid panel in AM  -NIH\neurocheck per protocol  -Activity as tolerated, fall risk precautions  -PT/OT/SLP evaluation     Essential hypertension,   -Per my discussion with stroke team, ok for normotensive blood pressure     Hyperlipidemia  -Lipid panel in AM  -Atorvastatin 80mg nightly p.o. daily for secondary stroke prevention     Alcohol use  -stroke team counseled on cessation  -Low risk, CIWA measurement ordered     Obesity  -BMI 34.70  -Complicated aspects of care  -Stroke team counseled on healthy diet exercise and weight loss    VTE Prophylaxis:  Mechanical VTE prophylaxis orders are present.          CODE STATUS:    Code Status and Medical Interventions: CPR (Attempt to Resuscitate); Full Support   Ordered at: 03/26/25 2158     Code Status (Patient has no pulse and is not breathing):    CPR (Attempt to Resuscitate)     Medical Interventions (Patient has pulse or is breathing):    Full Support     Level Of Support Discussed With:    Patient       Expected Discharge   Expected discharge date/ time has not been documented.     Ginger Zee MD  03/26/25

## 2025-03-27 NOTE — CONSULTS
Diabetes Education    Patient Name:  Jonnie Hollis  YOB: 1955  MRN: [id number]  Admit Date:  3/26/2025        Consult received for diabetes education per stroke protocol. Chart reviewed. A1c is 5.2%. No history of diabetes per H&P. Please reconsult as needed.       Electronically signed by:  Sameera Mills RN  03/27/25 09:41 EDT

## 2025-03-28 ENCOUNTER — READMISSION MANAGEMENT (OUTPATIENT)
Dept: CALL CENTER | Facility: HOSPITAL | Age: 70
End: 2025-03-28
Payer: COMMERCIAL

## 2025-03-28 LAB
QT INTERVAL: 396 MS
QTC INTERVAL: 415 MS

## 2025-03-28 NOTE — DISCHARGE SUMMARY
"    [facility] Medicine Services  DISCHARGE SUMMARY    Patient Name: Jonnie Hollis  : 1955  MRN: [id number]    Date of Admission: 3/26/2025  4:02 PM  Date of Discharge:  3/27/2025  Primary Care Physician: Anthony Mcdermott MD    Consults       No orders found from 2025 to 3/27/2025.            Hospital Course     Presenting Problem: CVA    Active Hospital Problems    Diagnosis  POA    **Occipital infarction [I63.9]  Yes      Resolved Hospital Problems   No resolved problems to display.          Hospital Course:  Jonnie Hollis is a 69 y.o. male here with occipital stroke     Occipital stroke/visual disturbances  -EKG NSR  -ECHO with positive bubble study, B LE duplex without DVT  -home on ASA/DAPT x 21 days, then ASA, with high dose statin  -Stroke team recommending Holter, arranging with heart and valve, and stroke team follow up  -on DAPT/statin  -further recommendations per stroke team, including outpatient cardiac monitoring     HTN  -permissive per stroke protocol     HL  -statin     Alcohol use    Discharge Follow Up Recommendations for outpatient labs/diagnostics:  As written    Day of Discharge     HPI: Still has \"gray\" spots in vision. No other neurologic deficits.         Objective  Objective      Vital Signs:   Temp:  [97.8 °F (36.6 °C)-98.9 °F (37.2 °C)] 97.9 °F (36.6 °C)  Heart Rate:  [51-80] 60  Resp:  [16-18] 18  BP: (130-143)/(79-91) 138/90     Physical Exam:  NAD alert and oriented  OP clear, dry MM  Neck supple  No LAD  RRR  CTAB  +BS, soft  CHANDLER  Normal affect  No gross neurologic deficits beyond visual disturbances    Pertinent  and/or Most Recent Results     LAB RESULTS:      Lab 25  0413 25  1612   WBC 4.93 5.90   HEMOGLOBIN 14.2 14.6   HEMATOCRIT 42.3 42.5   PLATELETS 232 236   NEUTROS ABS  --  3.89   IMMATURE GRANS (ABS)  --  0.03   LYMPHS ABS  --  0.96   MONOS ABS  --  0.89   EOS ABS  --  0.10   MCV 85.6 84.5         Lab " 03/27/25  0413 03/26/25  1645   SODIUM 142 139   POTASSIUM 3.8 3.6   CHLORIDE 106 102   CO2 24.0 25.0   ANION GAP 12.0 12.0   BUN 15 15   CREATININE 1.08 1.20   EGFR 74.7 65.9   GLUCOSE 92 86   CALCIUM 8.9 9.1   MAGNESIUM  --  2.0   HEMOGLOBIN A1C 5.20  --          Lab 03/26/25  1645   TOTAL PROTEIN 6.6   ALBUMIN 4.0   GLOBULIN 2.6   ALT (SGPT) 22   AST (SGOT) 20   BILIRUBIN 0.4   ALK PHOS 80             Lab 03/27/25  0413   CHOLESTEROL 157   LDL CHOL 95   HDL CHOL 40   TRIGLYCERIDES 120             Brief Urine Lab Results       None          Microbiology Results (last 10 days)       ** No results found for the last 240 hours. **            Duplex Venous Lower Extremity - Bilateral CAR  Result Date: 3/27/2025    Normal bilateral lower extremity venous duplex scan.     CT Angiogram Head  Result Date: 3/27/2025  CT ANGIOGRAM HEAD, CT ANGIOGRAM NECK Date of Exam: 3/26/2025 11:50 PM EDT Indication: occipital stroke; eval vasculature. Comparison: 3/26/2025. Technique: CTA of the head and neck was performed after the uneventful intravenous administration of intravenous contrast. Reconstructed coronal and sagittal images were also obtained. In addition, a 3-D volume rendered image was created for interpretation. Automated exposure control and iterative reconstruction methods were used. Findings: No evidence of hemodynamically significant stenosis, AVM or aneurysm within the head or neck by NASCET criteria. Right posterior cerebral and posterior inferior cerebellar arteries appear patent. Atherosclerotic calcifications are present along the carotid bifurcations bilaterally which appear superficial. No large vessel occlusion identified. No evidence of dissection. Soft tissues of the neck demonstrate no acute process. No acute osseous abnormality identified. Lung apices are clear.     Impression: No evidence of hemodynamically significant stenosis, AVM or aneurysm within the head or neck by NASCET criteria. Electronically  Signed: Liliam Jama MD  3/27/2025 12:08 AM EDT  Workstation ID: [id number]    CT Angiogram Neck  Result Date: 3/27/2025  CT ANGIOGRAM HEAD, CT ANGIOGRAM NECK Date of Exam: 3/26/2025 11:50 PM EDT Indication: occipital stroke; eval vasculature. Comparison: 3/26/2025. Technique: CTA of the head and neck was performed after the uneventful intravenous administration of intravenous contrast. Reconstructed coronal and sagittal images were also obtained. In addition, a 3-D volume rendered image was created for interpretation. Automated exposure control and iterative reconstruction methods were used. Findings: No evidence of hemodynamically significant stenosis, AVM or aneurysm within the head or neck by NASCET criteria. Right posterior cerebral and posterior inferior cerebellar arteries appear patent. Atherosclerotic calcifications are present along the carotid bifurcations bilaterally which appear superficial. No large vessel occlusion identified. No evidence of dissection. Soft tissues of the neck demonstrate no acute process. No acute osseous abnormality identified. Lung apices are clear.     Impression: No evidence of hemodynamically significant stenosis, AVM or aneurysm within the head or neck by NASCET criteria. Electronically Signed: Liliam Jama MD  3/27/2025 12:08 AM EDT  Workstation ID: [id number]    MRI Brain Without Contrast  Result Date: 3/26/2025  MRI BRAIN WO CONTRAST Date of Exam: 3/26/2025 5:40 PM EDT Indication: L peripheral vision loss.  Comparison: None available. Technique:  Routine multiplanar/multisequence sequence images of the brain were obtained without contrast administration. Findings: There is an area of restricted diffusion involving the right occipital lobe with corresponding signal abnormality on FLAIR sequence consistent with acute to subacute infarction. There is no evidence of acute or chronic intracranial hemorrhage. No mass effect or midline shift. No abnormal extra-axial collections. There  is mild to moderate nonspecific periventricular and subcortical white matter signal abnormality otherwise most frequently seen in setting of chronic microvascular ischemic disease. The basal ganglia, brainstem and cerebellum appear within normal limits. Midline structures are intact. No additional significant cortical abnormality is identified. Calvarial and superficial soft tissue signal is within normal limits. Orbits appear unremarkable. The paranasal sinuses and the mastoid air cells appear well aerated.     Impression: 1.Acute to subacute right occipital infarction. Electronically Signed: Apolinar Sutton MD  3/26/2025 6:04 PM EDT  Workstation ID: [id number]      Results for orders placed during the hospital encounter of 03/26/25    Duplex Venous Lower Extremity - Bilateral CAR    Interpretation Summary    Normal bilateral lower extremity venous duplex scan.      Results for orders placed during the hospital encounter of 03/26/25    Duplex Venous Lower Extremity - Bilateral CAR    Interpretation Summary    Normal bilateral lower extremity venous duplex scan.      Results for orders placed during the hospital encounter of 03/26/25    Adult Transthoracic Echo Complete W/ Cont if Necessary Per Protocol (With Agitated Saline)    Interpretation Summary    Left ventricular systolic function is normal. Calculated left ventricular EF = 62.6% Normal left ventricular cavity size and wall thickness noted. All left ventricular wall segments contract normally. Left ventricular diastolic function was normal.    The right ventricular cavity is mild to moderately dilated. Normal right ventricular systolic function noted.    The left atrial cavity is mildly dilated. Left atrial volume is borderline increased. Saline test results are positive for right to left atrial level shunt.    The aortic valve is grossly normal in structure. The aortic valve appears trileaflet. Trace aortic valve regurgitation is present. No aortic valve  stenosis is present.    The mitral valve is grossly normal in structure. Mild mitral valve regurgitation is present. No significant mitral valve stenosis is present.    Mild tricuspid valve regurgitation is present. Estimated right ventricular systolic pressure from tricuspid regurgitation is normal (<35 mmHg). No evidence of significant tricuspid valve stenosis is present.      Plan for Follow-up of Pending Labs/Results: Reviewed    Discharge Details        Discharge Medications        New Medications        Instructions Start Date   atorvastatin 80 MG tablet  Commonly known as: LIPITOR   80 mg, Oral, Nightly      clopidogrel 75 MG tablet  Commonly known as: PLAVIX   75 mg, Oral, Daily      folic acid 1 MG tablet  Commonly known as: FOLVITE   1 mg, Oral, Daily             Continue These Medications        Instructions Start Date   aspirin 81 MG chewable tablet   81 mg, Oral, Daily      doxazosin 4 MG tablet  Commonly known as: CARDURA   No dose, route, or frequency recorded.      felodipine 10 MG 24 hr tablet  Commonly known as: PLENDIL   No dose, route, or frequency recorded.      hydroCHLOROthiazide 12.5 MG capsule  Commonly known as: MICROZIDE   12.5 mg, Oral, Daily      losartan 50 MG tablet  Commonly known as: COZAAR   No dose, route, or frequency recorded.      sildenafil 100 MG tablet  Commonly known as: VIAGRA                No Known Allergies      Discharge Disposition:  Home or Self Care    Diet:  Hospital:No active diet order           Activity:      Restrictions or Other Recommendations:         CODE STATUS:    Code Status and Medical Interventions: CPR (Attempt to Resuscitate); Full Support   Ordered at: 03/26/25 2158     Code Status (Patient has no pulse and is not breathing):    CPR (Attempt to Resuscitate)     Medical Interventions (Patient has pulse or is breathing):    Full Support     Level Of Support Discussed With:    Patient       Future Appointments   Date Time Provider Department Center    8/19/2025  2:45 PM John Baum MD [facility]       Additional Instructions for the Follow-ups that You Need to Schedule       Ambulatory Referral to [facility] [place] [facility]   As directed      Service Requested: Cardiac Monitor        Ambulatory Referral to Neurology   As directed      1 month post-stroke follow-up    Order Comments: 1 month post-stroke follow-up         Discharge Follow-up with PCP   As directed       Currently Documented PCP:    Anthony Mcdermott MD    PCP Phone Number:    [phone]     Follow Up Details: Next week/1 week                      Johnnie Jessica MD  03/28/25      Time Spent on Discharge:  I spent  40  minutes on this discharge activity which included: face-to-face encounter with the patient, reviewing the data in the system, coordination of the care with the nursing staff as well as consultants, documentation, and entering orders.

## 2025-03-28 NOTE — OUTREACH NOTE
Prep Survey      Flowsheet Row Responses   [facility] patient discharged [facility]? [place]   Is LACE score < 7 ? Yes   Eligibility Readm Mgmt   Discharge diagnosis Occipital infarction  Principal problem   Does the patient have one of the following disease processes/diagnoses(primary or secondary)? Stroke   Does the patient have Home health ordered? No   Is there a DME ordered? No   Prep survey completed? Yes            ISAMAR FIELDS - Registered Nurse

## 2025-03-28 NOTE — OUTREACH NOTE
Stroke Week 1 Survey      Flowsheet Row Responses   [facility] patient discharged from? [place]   Does the patient have one of the following disease processes/diagnoses(primary or secondary)? Stroke   Week 1 attempt successful? Yes   Call start time 1054   Call end time 1124   Discharge diagnosis Occipital infarction  Principal problem   Meds reviewed with patient/caregiver? Yes   Is the patient having any side effects they believe may be caused by any medication additions or changes? No   Does the patient have all medications ordered at discharge? Yes   Is the patient taking all medications as directed (includes completed medication regime)? Yes   Medication comments Pt v/u to take only one 81mg ASA per day, pt states that he was previously taking 4 per day.   Does the patient have a primary care provider?  Yes   Does the patient have an appointment with their PCP within 7 days of discharge? Yes   Has the patient kept scheduled appointments due by today? N/A   The Stroke Clinic at [facility] requests you follow up with them within 30 days for important follow up care. Please call [phone] to schedule this appointment. Thank you. Yes   Does the patient require any assistance with activities of daily living such as eating, bathing, dressing, walking, etc.? No   Does the patient have any residual symptoms from stroke/TIA? Yes   Residual symptoms comments The pt reports ongoing vision impairment in bilateral eyes, grey spots with tail, up to left of vision field in both R. and L. eye. Pt denies dizziness, HA, balance issues, numbness, tingling or other neuro deficits. Pt's speech during call was clear and he followed conversation appropriately.   Does the patient understand the diet ordered at discharge? No  [Pt and this nurse discussed limiting high fats, fried and sodium in diet. This nurse suggested keeping food log over a couple of days to evaluate pt's normal NA intake to know where he  needs to make changes, pt v/u.]   Comments The pt reports doing well at home, he has made all f/u appts and awaits call back from Cardiology. The patient remains independent with ADL's, per his report and denies issues with po intake.   Did the patient receive a copy of their discharge instructions? Yes   Nursing interventions Reviewed instructions with patient   What is the patient's perception of their health status since discharge? Improving   Nursing interventions Nurse provided patient education   Is the patient/caregiver able to teach back the risk factors for a stroke? High blood pressure-goal below 120/80   Is the patient/caregiver able to teach back signs and symptoms related to disease process for when to call PCP? Yes   Is the patient/caregiver able to teach back signs and symptoms related to disease process for when to call 911? Yes   If the patient is a current smoker, are they able to teach back resources for cessation? Not a smoker   Is the patient/caregiver able to teach back the hierarchy of who to call/visit for symptoms/problems? PCP, Specialist, Home health nurse, Urgent Care, ED, 911 Yes   Is the patient able to teach back FAST for Stroke? B alance: Watch for sudden loss of balance, E yes: Check for vision loss, F ace: Look for an uneven smile, A rm: Check if one arm is weak, S peech: Listen for slurred speech, T radha: Call 9-1-1 right away   Week 1 call completed? Yes   Revoked No further contact(revokes)-requires comment   Call end time 1124            Rita LINDQUIST - Registered Nurse

## 2025-03-31 ENCOUNTER — OFFICE VISIT (OUTPATIENT)
Dept: CARDIOLOGY | Facility: HOSPITAL | Age: 70
End: 2025-03-31
Payer: MEDICARE

## 2025-03-31 ENCOUNTER — HOSPITAL ENCOUNTER (OUTPATIENT)
Dept: CARDIOLOGY | Facility: HOSPITAL | Age: 70
Discharge: HOME | End: 2025-03-31
Payer: MEDICARE

## 2025-03-31 VITALS
HEIGHT: 66 IN | OXYGEN SATURATION: 95 % | WEIGHT: 218.2 LBS | BODY MASS INDEX: 35.07 KG/M2 | RESPIRATION RATE: 18 BRPM | DIASTOLIC BLOOD PRESSURE: 71 MMHG | HEART RATE: 83 BPM | SYSTOLIC BLOOD PRESSURE: 118 MMHG

## 2025-03-31 DIAGNOSIS — I67.841 REVERSIBLE CEREBROVASCULAR VASOCONSTRICTION SYNDROME: ICD-10-CM

## 2025-03-31 DIAGNOSIS — I63.9 CEREBROVASCULAR ACCIDENT (CVA), UNSPECIFIED MECHANISM: ICD-10-CM

## 2025-03-31 DIAGNOSIS — Q21.12 PFO (PATENT FORAMEN OVALE): ICD-10-CM

## 2025-03-31 DIAGNOSIS — I63.9 CEREBROVASCULAR ACCIDENT (CVA), UNSPECIFIED MECHANISM: Primary | ICD-10-CM

## 2025-03-31 PROCEDURE — 1160F RVW MEDS BY RX/DR IN RCRD: CPT | Performed by: NURSE PRACTITIONER

## 2025-03-31 PROCEDURE — 3074F SYST BP LT 130 MM HG: CPT | Performed by: NURSE PRACTITIONER

## 2025-03-31 PROCEDURE — 99215 OFFICE O/P EST HI 40 MIN: CPT | Performed by: NURSE PRACTITIONER

## 2025-03-31 PROCEDURE — 1159F MED LIST DOCD IN RCRD: CPT | Performed by: NURSE PRACTITIONER

## 2025-03-31 PROCEDURE — 3078F DIAST BP <80 MM HG: CPT | Performed by: NURSE PRACTITIONER

## 2025-03-31 NOTE — PROGRESS NOTES
"Chief Complaint  Occipital Infarction    Subjective      History of Present Illness {  Problem List  Visit  Diagnosis   Encounters  Notes  Medications  Labs  Result Review Imaging  Media :23}     Jonnie Hollis, 69 y.o. male with acute right occipital infarct, prior  TIA-2008 and CVA-2011, HTN, HLD presents to [facility] and Val[facility] Atrial Fibrillation/arrhythmia clinic for Occipital Infarction.    Patient recently hospitalized at [facility] from his ophthalmology clinic for evaluation of left homonymous hemianopsia. MRI brain reveals an acute right occipital infarct. CT angiogram head and neck without significant stenoses or large vessel occlusion.  TTE completed with preserved LVEF, positive bubble study with right-to-left atrial shunt.  Bilateral lower extremity venous duplex with no DVT noted.  Acute CVA thought likely cardioembolic in nature; recommendation for Holter monitor following discharge with loop recorder recommendation if no AF.    Presents today feeling well overall from a cardiovascular standpoint.  Reports visual defect continues, improving since hospital discharge.  Denies any prior palpitation/tachypalpitation symptom. Maintained on ASA for the past 15 years prior to recent CVA. Mother with CVA at 85yo and maintained on warfarin.       Objective     Vital Signs:   Vitals:    03/31/25 1333 03/31/25 1334   BP: 123/69 118/71   BP Location: Left arm Left arm   Patient Position: Sitting Standing   Cuff Size: Adult Adult   Pulse: 80 83   Resp: 18    SpO2: 93% 95%   Weight: 99 kg (218 lb 3.2 oz)    Height: 167.6 cm (66\")      Body mass index is 35.22 kg/m².  Physical Exam  Vitals and nursing note reviewed.   Constitutional:       Appearance: Normal appearance.   HENT:      Head: Normocephalic.   Eyes:      Extraocular Movements: Extraocular movements intact.   Neck:      Vascular: No carotid bruit.   Cardiovascular:      Rate and Rhythm: Normal rate and regular " rhythm.      Pulses: Normal pulses.      Heart sounds: Normal heart sounds, S1 normal and S2 normal. No murmur heard.  Pulmonary:      Effort: Pulmonary effort is normal. No respiratory distress.      Breath sounds: Normal breath sounds.   Musculoskeletal:      Cervical back: Neck supple.      Right lower leg: No edema.      Left lower leg: No edema.   Skin:     General: Skin is warm and dry.   Neurological:      General: No focal deficit present.      Mental Status: He is alert.   Psychiatric:         Mood and Affect: Mood normal.         Behavior: Behavior normal.         Thought Content: Thought content normal.        Data Reviewed:{ Labs  Result Review  Imaging  Med Tab  Media :23}     Duplex Venous Lower Extremity - Bilateral CAR (03/27/2025 16:06)  ECG 12 Lead Stroke Evaluation (03/27/2025 10:35)  Adult Transthoracic Echo Complete W/ Cont if Necessary Per Protocol (With Agitated Saline) (03/27/2025 15:17)    CBC (No Diff) (03/27/2025 04:13)  Basic Metabolic Panel (03/27/2025 04:13)  Lipid Panel (03/27/2025 04:13)  Hemoglobin A1c (03/27/2025 04:13)      Assessment & Plan   Assessment and Plan {CC Problem List  Visit Diagnosis  ROS  Review (Popup)  Health Maintenance  Quality  BestPractice  Medications  SmartSets  SnapShot Encounters  Media :23}     1. Cerebrovascular accident (CVA), unspecified mechanism  -Recently hospitalized at [facility] from his ophthalmology clinic for evaluation of left homonymous hemianopsia.   -MRI brain revealed an acute right occipital infarct.   -CT angiogram head and neck without significant stenoses or large vessel occlusion.    -TTE completed with preserved LVEF, positive bubble study with right-to-left atrial shunt.    -Bilateral lower extremity venous duplex with no DVT noted.    -Acute CVA thought likely cardioembolic in nature; recommendation for Holter monitor following discharge with loop recorder recommendation if no AF.  -30d MCOT for AF  surveillance  -Continue DAPT, atorvastatin as prescribed at hospital discharge  - Ambulatory Referral to Cardiology    2. PFO (patent foramen ovale)  -TTE completed with preserved LVEF, positive bubble study with right-to-left atrial shunt.    -Bilateral lower extremity venous duplex with no DVT noted.   -Continue DAPT as prescribed  - Ambulatory Referral to Cardiology    3.  HTN  -Well-controlled today  -Continue current medication regimen of losartan 50 Mg, HCTZ 12.5 Mg daily, doxazosin 4 Mg as prescribed      Follow Up {Instructions Charge Capture  Follow-up Communications :23}     Return if symptoms worsen or fail to improve.    Time Spent: I spent 43 minutes caring for Jonnie Hollis on this date of service. This time includes time spent by me in the following activities: preparing for the visit, reviewing tests, obtaining and/or reviewing a separately obtained history, performing a medically appropriate examination and/or evaluation, counseling and educating the patient/family/caregiver, documenting information in the medical record and independently interpreting results and communicating that information with the patient/family/caregiver. All time noted occurred on the date of service.    Patient was given instructions and counseling regarding his condition or for health maintenance advice. Please see specific information pulled into the AVS if appropriate. Patient was instructed to call the [facility] with any questions, concerns, or worsening symptoms.    Dictated Utilizing Dragon Dictation   Please note that portions of this note were completed with a voice recognition program. Part of this note may be an electronic transcription/translation of spoken language to printed text using the Dragon Dictation System.

## 2025-03-31 NOTE — PROGRESS NOTES
[facility] Heart Monitor Documentation    Jonnie Hollis  1955  [id number]  03/31/25      [] ZIO XT Patch  Model [id number] Prescribed for  Days    Serial Number: (N + 9 Digits) N   Apply-By Date on Box:   USPS Tracking Number:   USPS Tracking        [] [employer] MINI PLUS Mobile Cardiac Telemetry  Model BGMINIPLUS Prescribed for 30 Days    Serial Number: (BGM + 7 Digits) [id number]   Shipped-By Date on Box: 03/15/2025  UPS Tracking Number: [id number]  UPS Tracking      [] [employer] MINI Holter Monitor  Model BGMINIEL Prescribed for  Days    Serial Number: (7 Digits)   Shipped-By Date on Box:   UPS Tracking Number: [id number]  UPS Tracking        This monitor was applied to the patient's chest and checked for proper functioning.  Mr. Jonnie Hollis was instructed in the proper use of this monitor.  He was given the opportunity to ask questions and left the office with the device 's instruction manual.    Brenda Green, [facility], 14:52 EDT, 03/31/25                  [facility] 8.8.[facility]

## 2025-04-03 ENCOUNTER — TELEPHONE (OUTPATIENT)
Dept: CARDIOLOGY | Facility: HOSPITAL | Age: 70
End: 2025-04-03
Payer: COMMERCIAL

## 2025-04-04 ENCOUNTER — TELEPHONE (OUTPATIENT)
Dept: CARDIOLOGY | Facility: HOSPITAL | Age: 70
End: 2025-04-04
Payer: COMMERCIAL

## 2025-04-04 NOTE — TELEPHONE ENCOUNTER
----- Message from Alberto Sanabria sent at 3/31/2025  4:09 PM EDT -----  I would check with neurology since they had prescribed them for CVA. Ok to hold per their recommendations from a cardiovascular standpoint.  Thanks  ----- Message -----  From: Brenda Green CMA  Sent: 3/31/2025   3:03 PM EDT  To: JAMI Hendricks    Patient had a question about medications Plavix or aspirin prior to root canal. Does he need to do anything special?

## 2025-04-07 ENCOUNTER — TELEPHONE (OUTPATIENT)
Dept: CARDIOLOGY | Facility: HOSPITAL | Age: 70
End: 2025-04-07
Payer: COMMERCIAL

## 2025-04-07 NOTE — TELEPHONE ENCOUNTER
Short duration pause noted.  Patient not on AV jon agents at this time.Will continue to monitor.

## 2025-04-14 ENCOUNTER — NURSE TRIAGE (OUTPATIENT)
Dept: CALL CENTER | Facility: HOSPITAL | Age: 70
End: 2025-04-14
Payer: COMMERCIAL

## 2025-04-14 NOTE — TELEPHONE ENCOUNTER
He was discharged from [facility]- on 03/27/25- with a stroke He was prescribed Plavix days 21 days- and he is taking ASA 81 mg. He has finished the Plavix and wants to know- should he increase the ASA back to 325mg as before he was in the hospital. Explained the RN can not answer that question. The question was not answered. Advised to call the PCP and or Neurologist. He states he understands and will call the office.   Reason for Disposition  • [1] Caller has URGENT medicine question about med that PCP or specialist prescribed AND [2] triager unable to answer question    Additional Information  • Negative: [1] Intentional drug overdose AND [2] suicidal thoughts or ideas  • Negative: Drug overdose and triager unable to answer question  • Negative: Caller requesting a renewal or refill of a medicine patient is currently taking  • Negative: Caller requesting information unrelated to medicine  • Negative: Caller requesting information about COVID-19 Vaccine  • Negative: Caller requesting information about Emergency Contraception  • Negative: Caller requesting information about Combined Birth Control Pills  • Negative: Caller requesting information about Progestin Birth Control Pills  • Negative: Caller requesting information about Post-Op pain or medicines  • Negative: Caller requesting a prescription antibiotic (such as Penicillin) for Strep throat and has a positive culture result  • Negative: Caller requesting a prescription anti-viral med (such as Tamiflu) and has influenza (flu) symptoms  • Negative: Immunization reaction suspected  • Negative: Rash while taking a medicine or within 3 days of stopping it  • Negative: [1] Asthma and [2] having symptoms of asthma (cough, wheezing, etc.)  • Negative: [1] Symptom of illness (e.g., headache, abdominal pain, earache, vomiting) AND [2] more than mild  • Negative: Breastfeeding questions about mother's medicines and diet  • Negative: MORE THAN A DOUBLE DOSE of a prescription  "or over-the-counter (OTC) drug  • Negative: [1] DOUBLE DOSE (an extra dose or lesser amount) of prescription drug AND [2] any symptoms (e.g., dizziness, nausea, pain, sleepiness)  • Negative: [1] DOUBLE DOSE (an extra dose or lesser amount) of over-the-counter (OTC) drug AND [2] any symptoms (e.g., dizziness, nausea, pain, sleepiness)  • Negative: Took another person's prescription drug  • Negative: [1] DOUBLE DOSE (an extra dose or lesser amount) of prescription drug AND [2] NO symptoms  (Exception: A double dose of antibiotics.)  • Negative: Diabetes drug error or overdose (e.g., took wrong type of insulin or took extra dose)  • Negative: [1] Prescription not at pharmacy AND [2] was prescribed by PCP recently (Exception: Triager has access to EMR and prescription is recorded there. Go to Home Care and confirm for pharmacy.)  • Negative: [1] Pharmacy calling with prescription question AND [2] triager unable to answer question    Answer Assessment - Initial Assessment Questions  1. NAME of MEDICINE: \"What medicine(s) are you calling about?\"      ASA   2. QUESTION: \"What is your question?\" (e.g., double dose of medicine, side effect)      Does he go back on the 325 ASA or keep taking 81 mg ASA.   3. PRESCRIBER: \"Who prescribed the medicine?\" Reason: if prescribed by specialist, call should be referred to that group.      Neurologist   4. SYMPTOMS: \"Do you have any symptoms?\" If Yes, ask: \"What symptoms are you having?\"  \"How bad are the symptoms (e.g., mild, moderate, severe)      none  5. PREGNANCY:  \"Is there any chance that you are pregnant?\" \"When was your last menstrual period?\"      na    Protocols used: Medication Question Call-ADULT-    "

## 2025-04-30 ENCOUNTER — OFFICE VISIT (OUTPATIENT)
Dept: NEUROLOGY | Facility: CLINIC | Age: 70
End: 2025-04-30
Payer: MEDICARE

## 2025-04-30 VITALS
OXYGEN SATURATION: 95 % | WEIGHT: 215 LBS | SYSTOLIC BLOOD PRESSURE: 110 MMHG | TEMPERATURE: 98.6 F | HEIGHT: 66 IN | DIASTOLIC BLOOD PRESSURE: 74 MMHG | HEART RATE: 87 BPM | BODY MASS INDEX: 34.55 KG/M2

## 2025-04-30 DIAGNOSIS — E78.5 BORDERLINE HYPERLIPIDEMIA: ICD-10-CM

## 2025-04-30 DIAGNOSIS — I10 HYPERTENSION, UNSPECIFIED TYPE: ICD-10-CM

## 2025-04-30 DIAGNOSIS — Z86.73 HISTORY OF STROKE: Primary | ICD-10-CM

## 2025-04-30 RX ORDER — CLOPIDOGREL BISULFATE 75 MG/1
1 TABLET ORAL DAILY
COMMUNITY
Start: 2025-04-14 | End: 2025-04-30

## 2025-04-30 NOTE — PROGRESS NOTES
New Patient Office Visit      Encounter Date: 2025   Patient Name: Jonnie Hollis  : 1955   MRN: [id number]   PCP: Anthony Mcdermott MD    Referring Provider: Rafaela Hagen MD     Chief Complaint:    Chief Complaint   Patient presents with    Establish Care       History of Present Illness: Jonnie Hollis is a 69-year-old right-handed  male with a known history of hypertension and hyperlipidemia. He presents to [facility] ED with a left visual field cut from his ophthalmologist's office. He was found to have a right occipital stroke. CT angiogram head and neck without significant stenoses or large vessel occlusion.  Etiology of stroke is suspected to be cardioembolic.  Echocardiogram showed an ejection fraction of 62% however was positive for a PFO and a mildly dilated left atrium.  Patient was recommended to wear a 30-day Holter monitor.  If this is unrevealing I would push for a loop recorder.  Patient was recommended to take dual antiplatelet therapy for 21 days followed by aspirin 81 mg monotherapy.    Clinic visit 2025: Patient is accompanied by his wife. He reports he has been doing well. He continues to have a small left visual field defect. He has been cleared to drive. He is working with Dr. Slaughter for visual therapy. He denies any new or worsening stroke like symptoms. He has been taking his medications without issues or side effects.     Stroke Risk Factors: hyperlipidemia and hypertension      Subjective      Review of Systems:   Review of Systems   Constitutional:  Negative for activity change, appetite change, chills, diaphoresis, fatigue, fever, unexpected weight gain and unexpected weight loss.   Eyes:  Positive for visual disturbance.   Respiratory:  Negative for cough and shortness of breath.    Cardiovascular:  Negative for chest pain, palpitations and leg swelling.   Gastrointestinal:  Negative for blood in stool, nausea and vomiting.   Genitourinary:   Negative for hematuria.   Musculoskeletal:  Negative for gait problem.   Neurological:  Negative for dizziness, tremors, seizures, syncope, facial asymmetry, speech difficulty, weakness, light-headedness, numbness, headache, memory problem and confusion.       Past Medical History:   Past Medical History:   Diagnosis Date    Arthritis     Colon polyps     Hypertension     Kidney stone     Kidney stones     Lithotripsy in past    Stroke     Stroke (cerebrum)        Past Surgical History:   Past Surgical History:   Procedure Laterality Date    APPENDECTOMY      EXTRACORPOREAL SHOCK WAVE LITHOTRIPSY (ESWL)      KIDNEY STONE SURGERY  Multiple    LITHOTRIPSY  2003    VASECTOMY  1990       Family History:   Family History   Problem Relation Age of Onset    Stroke Mother     Cancer Father     Heart disease Maternal Grandfather     Heart disease Paternal Grandfather        Social History:   Social History     Socioeconomic History    Marital status:    Tobacco Use    Smoking status: Never     Passive exposure: Never    Smokeless tobacco: Never   Vaping Use    Vaping status: Never Used   Substance and Sexual Activity    Alcohol use: Yes     Alcohol/week: 14.0 standard drinks of alcohol     Types: 14 Glasses of wine per week    Drug use: No    Sexual activity: Defer       Medications:     Current Outpatient Medications:     aspirin 81 MG chewable tablet, Chew 1 tablet Daily., Disp: , Rfl:     atorvastatin (LIPITOR) 80 MG tablet, Take 1 tablet by mouth Every Night., Disp: 90 tablet, Rfl: 0    doxazosin (CARDURA) 4 MG tablet, , Disp: , Rfl:     felodipine (PLENDIL) 10 MG 24 hr tablet, , Disp: , Rfl:     hydrochlorothiazide (MICROZIDE) 12.5 MG capsule, Take 1 capsule by mouth Daily., Disp: , Rfl:     losartan (COZAAR) 50 MG tablet, , Disp: , Rfl:     sildenafil (VIAGRA) 100 MG tablet, , Disp: , Rfl:     Allergies:   No Known Allergies    Objective     Physical Exam:  Vital Signs:   Vitals:    04/30/25 0843   BP: 110/74  "  Pulse: 87   Temp: 98.6 °F (37 °C)   SpO2: 95%   Weight: 97.5 kg (215 lb)   Height: 167.6 cm (65.98\")     Body mass index is 34.72 kg/m².     Physical Exam  Vitals and nursing note reviewed.   Constitutional:       General: He is awake. He is not in acute distress.     Appearance: Normal appearance. He is obese. He is not ill-appearing.      Comments: 69 year old  male    HENT:      Head: Normocephalic and atraumatic.      Nose: Nose normal.      Mouth/Throat:      Mouth: Mucous membranes are moist.   Eyes:      General: Lids are normal.      Extraocular Movements: Extraocular movements intact.      Pupils: Pupils are equal, round, and reactive to light.   Cardiovascular:      Rate and Rhythm: Normal rate and regular rhythm.      Pulses: Normal pulses.   Pulmonary:      Effort: Pulmonary effort is normal. No respiratory distress.   Skin:     General: Skin is warm and dry.   Neurological:      Mental Status: He is alert and oriented to person, place, and time.      Cranial Nerves: Cranial nerve deficit present.      Sensory: No sensory deficit.      Motor: Motor strength is normal.No weakness.      Coordination: Coordination normal.      Gait: Gait normal.   Psychiatric:         Mood and Affect: Mood normal.         Speech: Speech normal.         Behavior: Behavior normal.       Neurological Exam  Mental Status  Awake and alert. Oriented to person, place, time and situation. Oriented to person, place, and time. Speech is normal. Language is fluent with no aphasia. Attention and concentration are normal. Fund of knowledge is appropriate for level of education.    Cranial Nerves  CN II: Right visual acuity: Counts fingers. Left visual acuity: Counts fingers. Left homonymous hemianopsia. Improving .  CN III, IV, VI: Extraocular movements intact bilaterally. Normal lids and orbits bilaterally. Pupils equal round and reactive to light bilaterally.  CN V: Facial sensation is normal.  CN VII: Full and symmetric " facial movement.  CN VIII: Hearing is normal to speech .  CN XI: Shoulder shrug strength is normal.  CN XII: Tongue midline without atrophy or fasciculations.    Motor  Normal muscle bulk throughout. No fasciculations present. Normal muscle tone. Strength is 5/5 throughout all four extremities.    Sensory  Light touch is normal in upper and lower extremities.     Coordination  Right: Finger-to-nose normal.Left: Finger-to-nose normal.  No obvious dysmetria .    Gait   Normal gait.Casual gait is normal including stance, stride, and arm swing.     NIH Stroke Scale    1a  Level of consciousness: 0=alert; keenly responsive   1b. LOC questions:  0=Answers both questions correctly    1c. LOC commands: 0=Performs both tasks correctly   2.  Best Gaze: 0=normal   3. Visual: 1=Partial hemianopia   4. Facial Palsy: 0=Normal symmetric movement   5a. Motor left arm: 0=No drift, limb holds 90 (or 45) degrees for full 10 seconds   5b.  Motor right arm: 0=No drift, limb holds 90 (or 45) degrees for full 10 seconds   6a. Motor left le=No drift, limb holds 90 (or 45) degrees for full 10 seconds   6b  Motor right le=No drift, limb holds 90 (or 45) degrees for full 10 seconds   7. Limb Ataxia: 0=Absent   8.  Sensory: 0=Normal; no sensory loss   9. Best Language:  0=No aphasia, normal   10. Dysarthria: 0=Normal   11. Extinction and Inattention: 0=No abnormality    Total:   0        Modified Jarad Score: 1        0  No Symptoms    1 No significant disability. Able to carry out all usual activities, despite some symptoms.    2 Slight disability. Able to look after own affairs without assistance, but unable to carry out all previous activities.    3 Moderate disability. Requires some help, but able to walk unassisted.    4 Moderately severe disability. Unable to attend to own bodily needs without assistance, and unable to walk unassisted.    5 Severe disability. Requires constant nursing care and attention, bedridden, incontinent.     6 Dead       PHQ-9 Depression Screening  Little interest or pleasure in doing things? Not at all   Feeling down, depressed, or hopeless? Not at all   PHQ-2 Total Score 0   Trouble falling or staying asleep, or sleeping too much?     Feeling tired or having little energy?     Poor appetite or overeating?     Feeling bad about yourself - or that you are a failure or have let yourself or your family down?     Trouble concentrating on things, such as reading the newspaper or watching television?     Moving or speaking so slowly that other people could have noticed? Or the opposite - being so fidgety or restless that you have been moving around a lot more than usual?       Thoughts that you would be better off dead, or of hurting yourself in some way?     PHQ-9 Total Score     If you checked off any problems, how difficult have these problems made it for you to do your work, take care of things at home, or get along with other people?             STOP-Bang Score  Have you been diagnosed with Sleep Apnea?: yes (pt wears a CPAP)       [facility] Fall Risk Clinician Key Questions   Have you fallen in the past year?: No  Do you feel unsteady with walking?: No  Are you worried about falling?: No      Imaging Reviewed:   CT Angiogram Head  Result Date: 3/27/2025  Impression: No evidence of hemodynamically significant stenosis, AVM or aneurysm within the head or neck by NASCET criteria. Electronically Signed: Liliam Jama MD  3/27/2025 12:08 AM EDT  Workstation ID: [id number]    CT Angiogram Neck  Result Date: 3/27/2025  Impression: No evidence of hemodynamically significant stenosis, AVM or aneurysm within the head or neck by NASCET criteria. Electronically Signed: Liliam Jama MD  3/27/2025 12:08 AM EDT  Workstation ID: [id number]    MRI Brain Without Contrast  Result Date: 3/26/2025  Impression: 1.Acute to subacute right occipital infarction. Electronically Signed: Apolinar Sutton MD  3/26/2025 6:04 PM EDT  Workstation ID:  [id number]       Laboratory Results:   Hemoglobin   Date Value Ref Range Status   03/27/2025 14.2 13.0 - 17.7 g/dL Final     Hematocrit   Date Value Ref Range Status   03/27/2025 42.3 37.5 - 51.0 % Final     Platelets   Date Value Ref Range Status   03/27/2025 232 140 - 450 10*3/mm3 Final     Hemoglobin A1C   Date Value Ref Range Status   03/27/2025 5.20 4.80 - 5.60 % Final     LDL Cholesterol    Date Value Ref Range Status   03/27/2025 95 0 - 100 mg/dL Final     AST (SGOT)   Date Value Ref Range Status   03/26/2025 20 1 - 40 U/L Final     ALT (SGPT)   Date Value Ref Range Status   03/26/2025 22 1 - 41 U/L Final             Assessment / Plan      Assessment/Plan:   #History of right occipital infarct (3/26/2025)  #Hyperlipidemia  #Hypertension  -Suspect cardioembolic etiology.  -Continue aspirin 81 mg daily  -Completed DAPT X 21 days with aspirin and clopidogrel 75 mg daily. Recommend to discontinue Plavix today. Patient reports he wants to continue until seen by cardiology next week.   -Continue atorvastatin 80 mg nightly. LDL 95, goal <70.  -TTE + PFO. Rope 4. 38% chance that stroke is due to PFO. Appreciate cardiology recommendations.   -Patient completed 30 day cardiac monitor yesterday and has mailed the monitor back. Await report.   -If no atrial fibrillation is found on Holter monitor, recommend loop recorder  -If atrial fibrillation is found, recommend discontinue dual antiplatelet therapy and add apixaban 5 mg twice daily  -BP goals <130/80   -Has been cleared to drive by ophthalmology. Patient is continuing to work with Dr. Slaughter for visual therapy.   -Reviewed s/sxs of stroke and when to call 911      Discussed the importance of medication compliance and lifestyle modifications (adequate blood pressure control, adequate control of hyperlipidemia, adequate glycemic control, increase physical activity, and healthy diet) to help reduce the risk of future cerebrovascular events.  Also discussed the signs  symptoms that would warrant the patient return back to the emergency department including unilateral weakness, unilateral numbness, visual disturbances, loss of balance, speech difficulties, and/or a sudden severe headache.      Follow Up:   Return in about 2 months (around 6/30/2025).      JAMI Vaughn  [facility]

## 2025-05-06 NOTE — H&P (VIEW-ONLY)
[facility]  [address], [address]  [place], [place], [address]    [phone]  [email or web address]           OUTPATIENT CLINIC CONSULTATION NOTE    Patient care team:  Patient Care Team:  Anthony Mcdermott MD as PCP - General    Requesting Provider and Reason for consultation: The patient is being seen today at the request of JAMI Hendricks for CVA, PFO.     Subjective:   Chief complaint:   Chief Complaint   Patient presents with    [facility]     New Patient    CVA         Jonnie Hollis is a 69 y.o. male.  Cardiac focused problem list:  CVA, TIA history  TIA 2008, data deficit, symptoms resolved  CVA 2011, sneezed then turned his head, then had neurologic symptoms seconds later. Head scans suggested possibly a small vessel issue. Very minor residual symptoms (speech patterns)  MRI brain reveals right occipital stroke, 3/2025.  Etiology suspected to be cardioembolic by stroke neurology.   Venous duplex, 3/2025:  No evidence of acute DVT.   30 day event monitor, 5/01/2025:  Singular 3.3 second pause following a PVC on day 3 of monitoring.  Very brief 2-1 AV block episode lasting only a few seconds on day 19.  No patient triggered events.   PFO   Echocardiogram 3/27/2025:  LVEF 62.6%. Mild to moderately dilated RV. Mildly dilated LA. Saline test results positive for right to left atrial level shunt. Trace AR. Mild MR. Mild TR.  Normal RVSP.   Hypertension   Hyperlipidemia   Polycythemia     HPI:    Patient presents today in consultation for CVA and PFO.     Stroke history reviewed with patient and as noted above.  Mild persistent strokelike symptoms.  Denies chest pain, palpitations.    Review of Systems:  As noted above in the HPI    PFSH:  Patient Active Problem List   Diagnosis    Essential hypertension    Borderline hyperlipidemia    Vitamin D deficiency    Polycythemia    Cerebrovascular accident (CVA)    Enlarged LA (left atrium)         Current  "Outpatient Medications:     aspirin 81 MG chewable tablet, Chew 1 tablet Daily., Disp: , Rfl:     atorvastatin (LIPITOR) 80 MG tablet, Take 1 tablet by mouth Every Night., Disp: 90 tablet, Rfl: 0    doxazosin (CARDURA) 4 MG tablet, , Disp: , Rfl:     felodipine (PLENDIL) 10 MG 24 hr tablet, , Disp: , Rfl:     hydrochlorothiazide (MICROZIDE) 12.5 MG capsule, Take 1 capsule by mouth Daily., Disp: , Rfl:     losartan (COZAAR) 50 MG tablet, , Disp: , Rfl:     No Known Allergies    Social History     Socioeconomic History    Marital status:    Tobacco Use    Smoking status: Never     Passive exposure: Never    Smokeless tobacco: Never   Vaping Use    Vaping status: Never Used   Substance and Sexual Activity    Alcohol use: Yes     Alcohol/week: 6.0 standard drinks of alcohol     Types: 4 Glasses of wine, 2 Cans of beer per week    Drug use: No    Sexual activity: Not Currently     Partners: Female     Birth control/protection: Post-menopausal, Vasectomy     Family History   Problem Relation Age of Onset    Stroke Mother     Cancer Father     Heart disease Maternal Grandfather     Heart disease Paternal Grandfather          Objective:   Physical Exam:  /74 (BP Location: Right arm, Patient Position: Sitting, Cuff Size: Adult)   Pulse 71   Ht 167.6 cm (65.98\")   Wt 97.3 kg (214 lb 9.6 oz)   SpO2 95%   BMI 34.65 kg/m²   CONSTITUTIONAL: No acute distress  CARDIOVASCULAR: Regular rate and rhythm. Without murmur.  PERIPHERAL VASCULAR: No carotid bruit bilaterally.  Normal radial pulse.     Labs:  Labs reviewed by myself    Lab Results   Component Value Date    CHOL 157 03/27/2025     Lab Results   Component Value Date    TRIG 120 03/27/2025     Lab Results   Component Value Date    HDL 40 03/27/2025     Lab Results   Component Value Date    LDL 95 03/27/2025     No components found for: \"LDLDIRECTC\"    Diagnostic Data:    Procedures    Results for orders placed during the hospital encounter of " 03/26/25    Adult Transthoracic Echo Complete W/ Cont if Necessary Per Protocol (With Agitated Saline)    Interpretation Summary    Left ventricular systolic function is normal. Calculated left ventricular EF = 62.6% Normal left ventricular cavity size and wall thickness noted. All left ventricular wall segments contract normally. Left ventricular diastolic function was normal.    The right ventricular cavity is mild to moderately dilated. Normal right ventricular systolic function noted.    The left atrial cavity is mildly dilated. Left atrial volume is borderline increased. Saline test results are positive for right to left atrial level shunt.    The aortic valve is grossly normal in structure. The aortic valve appears trileaflet. Trace aortic valve regurgitation is present. No aortic valve stenosis is present.    The mitral valve is grossly normal in structure. Mild mitral valve regurgitation is present. No significant mitral valve stenosis is present.    Mild tricuspid valve regurgitation is present. Estimated right ventricular systolic pressure from tricuspid regurgitation is normal (<35 mmHg). No evidence of significant tricuspid valve stenosis is present.      Assessment and Plan:     CVA  Enlarged left atrium  Essential hypertension   - Most recent stroke possibly cardioembolic per stroke neurology  -Wearable heart monitor negative for A-fib.  Recommend loop recorder implant.  Risk versus benefits and procedure discussed with patient and his wife.  They are agreeable to proceed.  - Uncertain if PFO is incidental or causally related; with that said, currently seems more likely that the patient has undiagnosed A-fib.  Venous duplex negative at time of CVA.  Of note, the patient also had a strokelike syndrome in 2008, which was when he was in his 50s.  Rope score at that time likely would have looked different, but ultimately was not found to have a definitive cortical stroke.  - Continue current  antihypertensives  - Aspirin, clopidogrel per stroke team  - If found to have A-fib, discussed switching aspirin and clopidogrel to Eliquis  - Continue statin  - Lifestyle and risk factor modifications recommended.    - Return in about 6 months (around 11/7/2025) for Follow up; either KARLO Bonds or KARLO Kirkland, APRN.

## 2025-05-06 NOTE — PROGRESS NOTES
[facility]  [address], [address]  [place], [place], [address]    [phone]  [email or web address]           OUTPATIENT CLINIC CONSULTATION NOTE    Patient care team:  Patient Care Team:  Anthony Mcdermott MD as PCP - General    Requesting Provider and Reason for consultation: The patient is being seen today at the request of JAMI Hendricks for CVA, PFO.     Subjective:   Chief complaint:   Chief Complaint   Patient presents with    [facility] [place] [facility]     New Patient    CVA         Jonnie Hollis is a 69 y.o. male.  Cardiac focused problem list:  CVA, TIA history  TIA 2008, data deficit, symptoms resolved  CVA 2011, sneezed then turned his head, then had neurologic symptoms seconds later. Head scans suggested possibly a small vessel issue. Very minor residual symptoms (speech patterns)  MRI brain reveals right occipital stroke, 3/2025.  Etiology suspected to be cardioembolic by stroke neurology.   Venous duplex, 3/2025:  No evidence of acute DVT.   30 day event monitor, 5/01/2025:  Singular 3.3 second pause following a PVC on day 3 of monitoring.  Very brief 2-1 AV block episode lasting only a few seconds on day 19.  No patient triggered events.   PFO   Echocardiogram 3/27/2025:  LVEF 62.6%. Mild to moderately dilated RV. Mildly dilated LA. Saline test results positive for right to left atrial level shunt. Trace AR. Mild MR. Mild TR.  Normal RVSP.   Hypertension   Hyperlipidemia   Polycythemia     HPI:    Patient presents today in consultation for CVA and PFO.     Stroke history reviewed with patient and as noted above.  Mild persistent strokelike symptoms.  Denies chest pain, palpitations.    Review of Systems:  As noted above in the HPI    PFSH:  Patient Active Problem List   Diagnosis    Essential hypertension    Borderline hyperlipidemia    Vitamin D deficiency    Polycythemia    Cerebrovascular accident (CVA)    Enlarged LA (left atrium)         Current  "Outpatient Medications:     aspirin 81 MG chewable tablet, Chew 1 tablet Daily., Disp: , Rfl:     atorvastatin (LIPITOR) 80 MG tablet, Take 1 tablet by mouth Every Night., Disp: 90 tablet, Rfl: 0    doxazosin (CARDURA) 4 MG tablet, , Disp: , Rfl:     felodipine (PLENDIL) 10 MG 24 hr tablet, , Disp: , Rfl:     hydrochlorothiazide (MICROZIDE) 12.5 MG capsule, Take 1 capsule by mouth Daily., Disp: , Rfl:     losartan (COZAAR) 50 MG tablet, , Disp: , Rfl:     No Known Allergies    Social History     Socioeconomic History    Marital status:    Tobacco Use    Smoking status: Never     Passive exposure: Never    Smokeless tobacco: Never   Vaping Use    Vaping status: Never Used   Substance and Sexual Activity    Alcohol use: Yes     Alcohol/week: 6.0 standard drinks of alcohol     Types: 4 Glasses of wine, 2 Cans of beer per week    Drug use: No    Sexual activity: Not Currently     Partners: Female     Birth control/protection: Post-menopausal, Vasectomy     Family History   Problem Relation Age of Onset    Stroke Mother     Cancer Father     Heart disease Maternal Grandfather     Heart disease Paternal Grandfather          Objective:   Physical Exam:  /74 (BP Location: Right arm, Patient Position: Sitting, Cuff Size: Adult)   Pulse 71   Ht 167.6 cm (65.98\")   Wt 97.3 kg (214 lb 9.6 oz)   SpO2 95%   BMI 34.65 kg/m²   CONSTITUTIONAL: No acute distress  CARDIOVASCULAR: Regular rate and rhythm. Without murmur.  PERIPHERAL VASCULAR: No carotid bruit bilaterally.  Normal radial pulse.     Labs:  Labs reviewed by myself    Lab Results   Component Value Date    CHOL 157 03/27/2025     Lab Results   Component Value Date    TRIG 120 03/27/2025     Lab Results   Component Value Date    HDL 40 03/27/2025     Lab Results   Component Value Date    LDL 95 03/27/2025     No components found for: \"LDLDIRECTC\"    Diagnostic Data:    Procedures    Results for orders placed during the hospital encounter of " 03/26/25    Adult Transthoracic Echo Complete W/ Cont if Necessary Per Protocol (With Agitated Saline)    Interpretation Summary    Left ventricular systolic function is normal. Calculated left ventricular EF = 62.6% Normal left ventricular cavity size and wall thickness noted. All left ventricular wall segments contract normally. Left ventricular diastolic function was normal.    The right ventricular cavity is mild to moderately dilated. Normal right ventricular systolic function noted.    The left atrial cavity is mildly dilated. Left atrial volume is borderline increased. Saline test results are positive for right to left atrial level shunt.    The aortic valve is grossly normal in structure. The aortic valve appears trileaflet. Trace aortic valve regurgitation is present. No aortic valve stenosis is present.    The mitral valve is grossly normal in structure. Mild mitral valve regurgitation is present. No significant mitral valve stenosis is present.    Mild tricuspid valve regurgitation is present. Estimated right ventricular systolic pressure from tricuspid regurgitation is normal (<35 mmHg). No evidence of significant tricuspid valve stenosis is present.      Assessment and Plan:     CVA  Enlarged left atrium  Essential hypertension   - Most recent stroke possibly cardioembolic per stroke neurology  -Wearable heart monitor negative for A-fib.  Recommend loop recorder implant.  Risk versus benefits and procedure discussed with patient and his wife.  They are agreeable to proceed.  - Uncertain if PFO is incidental or causally related; with that said, currently seems more likely that the patient has undiagnosed A-fib.  Venous duplex negative at time of CVA.  Of note, the patient also had a strokelike syndrome in 2008, which was when he was in his 50s.  Rope score at that time likely would have looked different, but ultimately was not found to have a definitive cortical stroke.  - Continue current  antihypertensives  - Aspirin, clopidogrel per stroke team  - If found to have A-fib, discussed switching aspirin and clopidogrel to Eliquis  - Continue statin  - Lifestyle and risk factor modifications recommended.    - Return in about 6 months (around 11/7/2025) for Follow up; either KARLO Bonds or KARLO Kirkland, APRN.

## 2025-05-07 ENCOUNTER — OFFICE VISIT (OUTPATIENT)
Dept: CARDIOLOGY | Facility: CLINIC | Age: 70
End: 2025-05-07
Payer: COMMERCIAL

## 2025-05-07 VITALS
WEIGHT: 214.6 LBS | SYSTOLIC BLOOD PRESSURE: 130 MMHG | HEIGHT: 66 IN | HEART RATE: 71 BPM | OXYGEN SATURATION: 95 % | DIASTOLIC BLOOD PRESSURE: 74 MMHG | BODY MASS INDEX: 34.49 KG/M2

## 2025-05-07 DIAGNOSIS — I63.9 CEREBROVASCULAR ACCIDENT (CVA), UNSPECIFIED MECHANISM: Primary | ICD-10-CM

## 2025-05-07 DIAGNOSIS — I51.7 ENLARGED LA (LEFT ATRIUM): ICD-10-CM

## 2025-05-07 DIAGNOSIS — I10 ESSENTIAL HYPERTENSION: ICD-10-CM

## 2025-05-15 ENCOUNTER — PREP FOR SURGERY (OUTPATIENT)
Dept: OTHER | Facility: HOSPITAL | Age: 70
End: 2025-05-15
Payer: COMMERCIAL

## 2025-05-15 DIAGNOSIS — Z86.73 HISTORY OF STROKE: Primary | ICD-10-CM

## 2025-05-15 RX ORDER — SODIUM CHLORIDE 0.9 % (FLUSH) 0.9 %
10 SYRINGE (ML) INJECTION EVERY 12 HOURS SCHEDULED
OUTPATIENT
Start: 2025-05-15

## 2025-05-15 RX ORDER — SODIUM CHLORIDE 9 MG/ML
40 INJECTION, SOLUTION INTRAVENOUS AS NEEDED
OUTPATIENT
Start: 2025-05-15

## 2025-05-15 RX ORDER — SODIUM CHLORIDE 0.9 % (FLUSH) 0.9 %
10 SYRINGE (ML) INJECTION AS NEEDED
OUTPATIENT
Start: 2025-05-15

## 2025-05-15 RX ORDER — BUPIVACAINE HCL/0.9 % NACL/PF 0.1 %
2000 PLASTIC BAG, INJECTION (ML) EPIDURAL ONCE
OUTPATIENT
Start: 2025-05-15 | End: 2025-05-15

## 2025-05-22 ENCOUNTER — HOSPITAL ENCOUNTER (OUTPATIENT)
Dept: CARDIOLOGY | Facility: HOSPITAL | Age: 70
Discharge: HOME | End: 2025-05-22
Attending: INTERNAL MEDICINE | Admitting: INTERNAL MEDICINE
Payer: COMMERCIAL

## 2025-05-22 VITALS
DIASTOLIC BLOOD PRESSURE: 105 MMHG | SYSTOLIC BLOOD PRESSURE: 129 MMHG | BODY MASS INDEX: 35.06 KG/M2 | WEIGHT: 210.4 LBS | RESPIRATION RATE: 14 BRPM | HEIGHT: 65 IN | OXYGEN SATURATION: 95 %

## 2025-05-22 DIAGNOSIS — I63.9 CEREBROVASCULAR ACCIDENT (CVA), UNSPECIFIED MECHANISM: ICD-10-CM

## 2025-05-22 DIAGNOSIS — Z86.73 HISTORY OF STROKE: ICD-10-CM

## 2025-05-22 LAB
ANION GAP SERPL CALCULATED.3IONS-SCNC: 12 MMOL/L (ref 5–15)
BUN SERPL-MCNC: 22 MG/DL (ref 8–23)
BUN/CREAT SERPL: 14.2 (ref 7–25)
CALCIUM SPEC-SCNC: 9.2 MG/DL (ref 8.6–10.5)
CHLORIDE SERPL-SCNC: 105 MMOL/L (ref 98–107)
CO2 SERPL-SCNC: 24 MMOL/L (ref 22–29)
CREAT SERPL-MCNC: 1.55 MG/DL (ref 0.76–1.27)
DEPRECATED RDW RBC AUTO: 40.2 FL (ref 37–54)
EGFRCR SERPLBLD CKD-EPI 2021: 48.5 ML/MIN/1.73
ERYTHROCYTE [DISTWIDTH] IN BLOOD BY AUTOMATED COUNT: 13.1 % (ref 12.3–15.4)
GLUCOSE SERPL-MCNC: 91 MG/DL (ref 65–99)
HCT VFR BLD AUTO: 39.6 % (ref 37.5–51)
HGB BLD-MCNC: 13.4 G/DL (ref 13–17.7)
MCH RBC QN AUTO: 28.6 PG (ref 26.6–33)
MCHC RBC AUTO-ENTMCNC: 33.8 G/DL (ref 31.5–35.7)
MCV RBC AUTO: 84.6 FL (ref 79–97)
PLATELET # BLD AUTO: 222 10*3/MM3 (ref 140–450)
PMV BLD AUTO: 10.1 FL (ref 6–12)
POTASSIUM SERPL-SCNC: 3.7 MMOL/L (ref 3.5–5.2)
RBC # BLD AUTO: 4.68 10*6/MM3 (ref 4.14–5.8)
SODIUM SERPL-SCNC: 141 MMOL/L (ref 136–145)
WBC NRBC COR # BLD AUTO: 5.13 10*3/MM3 (ref 3.4–10.8)

## 2025-05-22 PROCEDURE — 33285 INSJ SUBQ CAR RHYTHM MNTR: CPT

## 2025-05-22 PROCEDURE — 25010000002 CEFAZOLIN PER 500 MG: Performed by: HOSPITALIST

## 2025-05-22 PROCEDURE — 85027 COMPLETE CBC AUTOMATED: CPT | Performed by: HOSPITALIST

## 2025-05-22 PROCEDURE — C1764 EVENT RECORDER, CARDIAC: HCPCS

## 2025-05-22 PROCEDURE — 33285 INSJ SUBQ CAR RHYTHM MNTR: CPT | Performed by: INTERNAL MEDICINE

## 2025-05-22 PROCEDURE — 80048 BASIC METABOLIC PNL TOTAL CA: CPT | Performed by: HOSPITALIST

## 2025-05-22 PROCEDURE — 36415 COLL VENOUS BLD VENIPUNCTURE: CPT

## 2025-05-22 RX ORDER — SODIUM CHLORIDE 0.9 % (FLUSH) 0.9 %
10 SYRINGE (ML) INJECTION EVERY 12 HOURS SCHEDULED
Status: DISCONTINUED | OUTPATIENT
Start: 2025-05-22 | End: 2025-05-22 | Stop reason: HOSPADM

## 2025-05-22 RX ORDER — SODIUM CHLORIDE 9 MG/ML
40 INJECTION, SOLUTION INTRAVENOUS AS NEEDED
Status: DISCONTINUED | OUTPATIENT
Start: 2025-05-22 | End: 2025-05-22 | Stop reason: HOSPADM

## 2025-05-22 RX ORDER — CLOPIDOGREL BISULFATE 75 MG/1
75 TABLET ORAL DAILY
COMMUNITY
End: 2025-05-22 | Stop reason: HOSPADM

## 2025-05-22 RX ORDER — SODIUM CHLORIDE 0.9 % (FLUSH) 0.9 %
10 SYRINGE (ML) INJECTION AS NEEDED
Status: DISCONTINUED | OUTPATIENT
Start: 2025-05-22 | End: 2025-05-22 | Stop reason: HOSPADM

## 2025-05-22 RX ADMIN — SODIUM CHLORIDE 2000 MG: 900 INJECTION INTRAVENOUS at 12:07

## 2025-05-22 NOTE — INTERVAL H&P NOTE
H&P reviewed. The patient was examined and there are no changes to the H&P.  Patient with history of prior TIA and CVA.  Now with recent right occipital stroke 3/2025.  Etiology suspected to be cardioembolic for stroke neurology.  30-day event monitor without evidence of atrial fibrillation.  Patient presents today for loop recorder implant with Dr. Dewitt.  Prefers local anesthetic only.  Will receive prophylactic antibiotics today.  The risks, benefits, and alternatives of the procedure have been reviewed and the patient wishes to proceed.     Electronically signed by JAMI Cortes, 05/22/25, 12:08 PM EDT.

## 2025-05-28 ENCOUNTER — OFFICE VISIT (OUTPATIENT)
Dept: CARDIOLOGY | Facility: CLINIC | Age: 70
End: 2025-05-28
Payer: MEDICARE

## 2025-05-28 DIAGNOSIS — I63.9 CEREBROVASCULAR ACCIDENT (CVA), UNSPECIFIED MECHANISM: Primary | ICD-10-CM

## 2025-05-28 DIAGNOSIS — Z86.73 HISTORY OF STROKE: ICD-10-CM

## 2025-05-28 NOTE — PROGRESS NOTES
2025    Jonnie [facility], : 1955      Fever: No    Temperature if indicated:     Wound Location: Mid Sternum     Dressing Removed: Removed by MA/RN      Old Dressing Appearance:  Clean, dry    Wound Appearance: Redness []                  Drainage []                  Culture obtained []        Color: N/A     Consistency:        Amount: none         Gloves used, wound cleansed with sterile 4x4 and peroxide [x]       MD notified []     MD orders:     Antibiotic started []      If checked, type wound looked clean and tight, no leakage, snitched came our with no pain. There was a small amount of bruising. Cleaned and checked by Cem Lemus MA.    Other:       Appointment for follow-up scheduled for 3 months post procedure [x]    Future Appointments   Date Time Provider Department Center   2025  2:00 PM Stephany Kat APRN [facility]   2025  2:45 PM John Baum MD [facility]   12/3/2025  3:45 PM Odalys Bonds APRN [facility]           Cem Lemus, 26      MD Signature:______________________________ Completed By/Date:

## 2025-05-30 ENCOUNTER — TELEPHONE (OUTPATIENT)
Dept: NEUROLOGY | Facility: CLINIC | Age: 70
End: 2025-05-30
Payer: COMMERCIAL

## 2025-05-30 NOTE — TELEPHONE ENCOUNTER
Caller: Jonnie Hollis    Relationship: Self    Best call back number:   Telephone Information:   Mobile [phone]         Which medication are you concerned about: PLAVIX    What are your concerns: PT STATES CARDIOLOGY STOPPED THE ABOVE MEDICATION, HE IS WONDERING IF THIS MEDICATION NEEDS TO BE REPLACED BY A DIFFERENT MEDICATION FOR STROKE PREVENTION.

## 2025-06-02 ENCOUNTER — TELEPHONE (OUTPATIENT)
Dept: NEUROLOGY | Facility: CLINIC | Age: 70
End: 2025-06-02
Payer: COMMERCIAL

## 2025-06-02 NOTE — TELEPHONE ENCOUNTER
Caller: Jonnie Hollis    Relationship: Self    Best call back number:[phone]  What was the call regarding: PT CALLED ABOUT HIS RX QUESTION HE HAD FROM PREVIOUS ENCOUNTER ON 5/30/25. TRIED TO CONNECT TO CLINICAL BUT WAS NOT SUCCESSFUL.     PLEASE REVIEW  THANK YOU

## 2025-07-11 ENCOUNTER — OFFICE VISIT (OUTPATIENT)
Dept: NEUROLOGY | Facility: CLINIC | Age: 70
End: 2025-07-11
Payer: COMMERCIAL

## 2025-07-11 VITALS
WEIGHT: 216 LBS | TEMPERATURE: 99.3 F | DIASTOLIC BLOOD PRESSURE: 70 MMHG | BODY MASS INDEX: 35.99 KG/M2 | OXYGEN SATURATION: 96 % | HEIGHT: 65 IN | SYSTOLIC BLOOD PRESSURE: 116 MMHG | HEART RATE: 94 BPM

## 2025-07-11 DIAGNOSIS — Z86.73 HISTORY OF STROKE: Primary | ICD-10-CM

## 2025-07-11 PROCEDURE — 99214 OFFICE O/P EST MOD 30 MIN: CPT | Performed by: NURSE PRACTITIONER

## 2025-07-11 RX ORDER — ASPIRIN 325 MG
325 TABLET ORAL DAILY
Start: 2025-07-11 | End: 2026-07-11

## 2025-07-11 RX ORDER — FOLIC ACID 1 MG/1
1000 TABLET ORAL DAILY
COMMUNITY
Start: 2025-05-19

## 2025-07-11 NOTE — PROGRESS NOTES
Follow Up Office Visit      Encounter Date: 2025   Patient Name: Jonnie Hollis  : 1955   MRN: [id number]   PCP: Anthony Mcdermott MD    Chief Complaint:    Chief Complaint   Patient presents with    Follow-up for management of stroke        History of Present Illness: Jonnie Hollis is a 69-year-old right-handed  male with a known history of hypertension and hyperlipidemia. He presents to [facility] ED with a left visual field cut from his ophthalmologist's office. He was found to have a right occipital stroke. CT angiogram head and neck without significant stenoses or large vessel occlusion.  Etiology of stroke is suspected to be cardioembolic.  Echocardiogram showed an ejection fraction of 62% however was positive for a PFO and a mildly dilated left atrium.  Patient was recommended to wear a 30-day Holter monitor.  If this is unrevealing I would push for a loop recorder.  Patient was recommended to take dual antiplatelet therapy for 21 days followed by aspirin 81 mg monotherapy.     Clinic visit 2025: Patient is accompanied by his wife. He reports he has been doing well. He continues to have a small left visual field defect. He has been cleared to drive. He is working with Dr. Slaughter for visual therapy. He denies any new or worsening stroke like symptoms. He has been taking his medications without issues or side effects.    Clinic visit 2025: Patient presents to clinic for routine follow-up.  He denies any new neurologic symptoms.  He has been compliant with his medications without issues or side effects.  Since his last visit he has had a loop recorder placed.  There has been no atrial fibrillation or atrial flutter noted today.  Patient has no further questions or concerns at this time.    Subjective        I have reviewed and the following portions of the patient's history were updated as appropriate: past family history, past medical history, past social  "history, past surgical history and problem list.    Medications:     Current Outpatient Medications:     atorvastatin (LIPITOR) 80 MG tablet, Take 1 tablet by mouth Every Night., Disp: 90 tablet, Rfl: 0    doxazosin (CARDURA) 4 MG tablet, , Disp: , Rfl:     felodipine (PLENDIL) 10 MG 24 hr tablet, , Disp: , Rfl:     folic acid (FOLVITE) 1 MG tablet, Take 1 tablet by mouth Daily., Disp: , Rfl:     hydrochlorothiazide (MICROZIDE) 12.5 MG capsule, Take 1 capsule by mouth Daily., Disp: , Rfl:     losartan (COZAAR) 50 MG tablet, , Disp: , Rfl:     aspirin (Oswaldo Aspirin) 325 MG tablet, Take 1 tablet by mouth Daily., Disp: , Rfl:     Allergies:   No Known Allergies    Objective     Physical Exam:   Vital Signs:   Vitals:    07/11/25 1347   BP: 116/70   Pulse: 94   Temp: 99.3 °F (37.4 °C)   SpO2: 96%   Weight: 98 kg (216 lb)   Height: 165.1 cm (65\")     Body mass index is 35.94 kg/m².    Neurological Exam  Mental Status  Awake and alert. Oriented to person, place, time and situation. Oriented to person, place, and time. Speech is normal. Language is fluent with no aphasia. Attention and concentration are normal. Fund of knowledge is appropriate for level of education.     Cranial Nerves  CN II: Right visual acuity: Counts fingers. Left visual acuity: Counts fingers. Left homonymous hemianopsia. Improving .  CN III, IV, VI: Extraocular movements intact bilaterally. Normal lids and orbits bilaterally. Pupils equal round and reactive to light bilaterally.  CN V: Facial sensation is normal.  CN VII: Full and symmetric facial movement.  CN VIII: Hearing is normal to speech .  CN XI: Shoulder shrug strength is normal.  CN XII: Tongue midline without atrophy or fasciculations.     Motor  Normal muscle bulk throughout. No fasciculations present. Normal muscle tone. Strength is 5/5 throughout all four extremities.     Sensory  Light touch is normal in upper and lower extremities.      Coordination  Right: Finger-to-nose " normal.Left: Finger-to-nose normal.  No obvious dysmetria .     Gait   Normal gait.Casual gait is normal including stance, stride, and arm swing.     NIH Stroke Scale     1a  Level of consciousness: 0=alert; keenly responsive   1b. LOC questions:  0=Answers both questions correctly    1c. LOC commands: 0=Performs both tasks correctly   2.  Best Gaze: 0=normal   3. Visual: 1=Partial hemianopia   4. Facial Palsy: 0=Normal symmetric movement   5a. Motor left arm: 0=No drift, limb holds 90 (or 45) degrees for full 10 seconds   5b.  Motor right arm: 0=No drift, limb holds 90 (or 45) degrees for full 10 seconds   6a. Motor left le=No drift, limb holds 90 (or 45) degrees for full 10 seconds   6b  Motor right le=No drift, limb holds 90 (or 45) degrees for full 10 seconds   7. Limb Ataxia: 0=Absent   8.  Sensory: 0=Normal; no sensory loss   9. Best Language:  0=No aphasia, normal   10. Dysarthria: 0=Normal   11. Extinction and Inattention: 0=No abnormality     Total:   1     Modified Blaine Score: 1        0  No Symptoms    1 No significant disability. Able to carry out all usual activities, despite some symptoms.    2 Slight disability. Able to look after own affairs without assistance, but unable to carry out all previous activities.    3 Moderate disability. Requires some help, but able to walk unassisted.    4 Moderately severe disability. Unable to attend to own bodily needs without assistance, and unable to walk unassisted.    5 Severe disability. Requires constant nursing care and attention, bedridden, incontinent.    6 Dead      PHQ-9 Depression Screening  Little interest or pleasure in doing things? Not at all   Feeling down, depressed, or hopeless? Not at all   PHQ-2 Total Score 0   Trouble falling or staying asleep, or sleeping too much?     Feeling tired or having little energy?     Poor appetite or overeating?     Feeling bad about yourself - or that you are a failure or have let yourself or your family  down?     Trouble concentrating on things, such as reading the newspaper or watching television?     Moving or speaking so slowly that other people could have noticed? Or the opposite - being so fidgety or restless that you have been moving around a lot more than usual?       Thoughts that you would be better off dead, or of hurting yourself in some way?     PHQ-9 Total Score     If you checked off any problems, how difficult have these problems made it for you to do your work, take care of things at home, or get along with other people?           DOMINGO Fall Risk Clinician Key Questions   Have you fallen in the past year?: No  Do you feel unsteady with walking?: No  Are you worried about falling?: No      Hemoglobin   Date Value Ref Range Status   05/22/2025 13.4 13.0 - 17.7 g/dL Final     Hematocrit   Date Value Ref Range Status   05/22/2025 39.6 37.5 - 51.0 % Final     Platelets   Date Value Ref Range Status   05/22/2025 222 140 - 450 10*3/mm3 Final     Hemoglobin A1C   Date Value Ref Range Status   03/27/2025 5.20 4.80 - 5.60 % Final     LDL Cholesterol    Date Value Ref Range Status   03/27/2025 95 0 - 100 mg/dL Final     AST (SGOT)   Date Value Ref Range Status   03/26/2025 20 1 - 40 U/L Final     ALT (SGPT)   Date Value Ref Range Status   03/26/2025 22 1 - 41 U/L Final          Assessment / Plan      Assessment/Plan:   #History of right occipital infarct (3/26/2025)  #Hyperlipidemia  #Hypertension  -Suspect cardioembolic etiology.  -Continue aspirin 325 mg daily  -Completed DAPT X 21 days with aspirin and clopidogrel 75 mg daily.   -Continue atorvastatin 80 mg nightly. LDL 95, goal <70. Future lab draw with PCP.   -TTE + PFO. Rope 4. 38% chance that stroke is due to PFO. Appreciate cardiology recommendations.   -Patient has loop recorder in place with no evidence of A-fib today  -BP goals <130/80.  Today's /70.  Recommend taking blood pressure daily at home and keeping a log for PCP/cardiology.  -Has  been cleared to drive by ophthalmology. Patient is continuing to work with Dr. Slaughter for visual therapy.   -Reviewed s/sxs of stroke and when to call 911  -Follow-up in the stroke clinic in 3 months        Discussed the importance of medication compliance and lifestyle modifications (adequate blood pressure control, adequate control of hyperlipidemia, adequate glycemic control, increase physical activity, and healthy diet) to help reduce the risk of future cerebrovascular events.  Also discussed the signs symptoms that would warrant the patient return back to the emergency department including unilateral weakness, unilateral numbness, visual disturbances, loss of balance, speech difficulties, and/or a sudden severe headache.    Follow Up:   Return in about 3 months (around 10/11/2025).      JAMI Vaughn  [facility]

## 2025-07-16 LAB
MDC_IDC_MSMT_BATTERY_STATUS: NORMAL
MDC_IDC_PG_IMPLANT_DTM: NORMAL
MDC_IDC_PG_MFG: NORMAL
MDC_IDC_PG_MODEL: NORMAL
MDC_IDC_PG_SERIAL: NORMAL
MDC_IDC_PG_TYPE: NORMAL
MDC_IDC_SESS_DTM: NORMAL
MDC_IDC_SESS_TYPE: NORMAL
MDC_IDC_STAT_AT_BURDEN_PERCENT: 0
